# Patient Record
Sex: MALE | Race: WHITE | NOT HISPANIC OR LATINO | URBAN - METROPOLITAN AREA
[De-identification: names, ages, dates, MRNs, and addresses within clinical notes are randomized per-mention and may not be internally consistent; named-entity substitution may affect disease eponyms.]

---

## 2020-12-27 ENCOUNTER — EMERGENCY (EMERGENCY)
Facility: HOSPITAL | Age: 55
LOS: 1 days | End: 2020-12-27
Attending: STUDENT IN AN ORGANIZED HEALTH CARE EDUCATION/TRAINING PROGRAM
Payer: COMMERCIAL

## 2020-12-27 VITALS
OXYGEN SATURATION: 97 % | TEMPERATURE: 98 F | SYSTOLIC BLOOD PRESSURE: 150 MMHG | RESPIRATION RATE: 18 BRPM | HEART RATE: 83 BPM | WEIGHT: 255.07 LBS | HEIGHT: 75 IN | DIASTOLIC BLOOD PRESSURE: 94 MMHG

## 2020-12-27 VITALS
HEART RATE: 60 BPM | SYSTOLIC BLOOD PRESSURE: 123 MMHG | RESPIRATION RATE: 17 BRPM | DIASTOLIC BLOOD PRESSURE: 77 MMHG | OXYGEN SATURATION: 100 %

## 2020-12-27 LAB
ALBUMIN SERPL ELPH-MCNC: 4.2 G/DL — SIGNIFICANT CHANGE UP (ref 3.3–5)
ALP SERPL-CCNC: 54 U/L — SIGNIFICANT CHANGE UP (ref 40–120)
ALT FLD-CCNC: 32 U/L — SIGNIFICANT CHANGE UP (ref 10–45)
ANION GAP SERPL CALC-SCNC: 15 MMOL/L — SIGNIFICANT CHANGE UP (ref 5–17)
AST SERPL-CCNC: 32 U/L — SIGNIFICANT CHANGE UP (ref 10–40)
BASOPHILS # BLD AUTO: 0.06 K/UL — SIGNIFICANT CHANGE UP (ref 0–0.2)
BASOPHILS NFR BLD AUTO: 0.7 % — SIGNIFICANT CHANGE UP (ref 0–2)
BILIRUB SERPL-MCNC: 0.4 MG/DL — SIGNIFICANT CHANGE UP (ref 0.2–1.2)
BUN SERPL-MCNC: 17 MG/DL — SIGNIFICANT CHANGE UP (ref 7–23)
CALCIUM SERPL-MCNC: 9.2 MG/DL — SIGNIFICANT CHANGE UP (ref 8.4–10.5)
CHLORIDE SERPL-SCNC: 104 MMOL/L — SIGNIFICANT CHANGE UP (ref 96–108)
CO2 SERPL-SCNC: 19 MMOL/L — LOW (ref 22–31)
CREAT SERPL-MCNC: 0.68 MG/DL — SIGNIFICANT CHANGE UP (ref 0.5–1.3)
EOSINOPHIL # BLD AUTO: 0.07 K/UL — SIGNIFICANT CHANGE UP (ref 0–0.5)
EOSINOPHIL NFR BLD AUTO: 0.8 % — SIGNIFICANT CHANGE UP (ref 0–6)
GLUCOSE SERPL-MCNC: 109 MG/DL — HIGH (ref 70–99)
HCT VFR BLD CALC: 46.9 % — SIGNIFICANT CHANGE UP (ref 39–50)
HGB BLD-MCNC: 14.8 G/DL — SIGNIFICANT CHANGE UP (ref 13–17)
IMM GRANULOCYTES NFR BLD AUTO: 0.7 % — SIGNIFICANT CHANGE UP (ref 0–1.5)
LYMPHOCYTES # BLD AUTO: 1.33 K/UL — SIGNIFICANT CHANGE UP (ref 1–3.3)
LYMPHOCYTES # BLD AUTO: 15.3 % — SIGNIFICANT CHANGE UP (ref 13–44)
MCHC RBC-ENTMCNC: 29.1 PG — SIGNIFICANT CHANGE UP (ref 27–34)
MCHC RBC-ENTMCNC: 31.6 GM/DL — LOW (ref 32–36)
MCV RBC AUTO: 92.1 FL — SIGNIFICANT CHANGE UP (ref 80–100)
MONOCYTES # BLD AUTO: 0.47 K/UL — SIGNIFICANT CHANGE UP (ref 0–0.9)
MONOCYTES NFR BLD AUTO: 5.4 % — SIGNIFICANT CHANGE UP (ref 2–14)
NEUTROPHILS # BLD AUTO: 6.71 K/UL — SIGNIFICANT CHANGE UP (ref 1.8–7.4)
NEUTROPHILS NFR BLD AUTO: 77.1 % — HIGH (ref 43–77)
NRBC # BLD: 0 /100 WBCS — SIGNIFICANT CHANGE UP (ref 0–0)
PLATELET # BLD AUTO: 264 K/UL — SIGNIFICANT CHANGE UP (ref 150–400)
POTASSIUM SERPL-MCNC: 4.9 MMOL/L — SIGNIFICANT CHANGE UP (ref 3.5–5.3)
POTASSIUM SERPL-SCNC: 4.9 MMOL/L — SIGNIFICANT CHANGE UP (ref 3.5–5.3)
PROT SERPL-MCNC: 7.6 G/DL — SIGNIFICANT CHANGE UP (ref 6–8.3)
RBC # BLD: 5.09 M/UL — SIGNIFICANT CHANGE UP (ref 4.2–5.8)
RBC # FLD: 13.2 % — SIGNIFICANT CHANGE UP (ref 10.3–14.5)
SODIUM SERPL-SCNC: 138 MMOL/L — SIGNIFICANT CHANGE UP (ref 135–145)
WBC # BLD: 8.7 K/UL — SIGNIFICANT CHANGE UP (ref 3.8–10.5)
WBC # FLD AUTO: 8.7 K/UL — SIGNIFICANT CHANGE UP (ref 3.8–10.5)

## 2020-12-27 PROCEDURE — 99283 EMERGENCY DEPT VISIT LOW MDM: CPT

## 2020-12-27 PROCEDURE — 82962 GLUCOSE BLOOD TEST: CPT

## 2020-12-27 PROCEDURE — 99284 EMERGENCY DEPT VISIT MOD MDM: CPT

## 2020-12-27 PROCEDURE — 80177 DRUG SCRN QUAN LEVETIRACETAM: CPT

## 2020-12-27 PROCEDURE — 80053 COMPREHEN METABOLIC PANEL: CPT

## 2020-12-27 PROCEDURE — 93005 ELECTROCARDIOGRAM TRACING: CPT

## 2020-12-27 PROCEDURE — 93010 ELECTROCARDIOGRAM REPORT: CPT

## 2020-12-27 PROCEDURE — 85025 COMPLETE CBC W/AUTO DIFF WBC: CPT

## 2020-12-27 RX ORDER — SODIUM CHLORIDE 9 MG/ML
1000 INJECTION INTRAMUSCULAR; INTRAVENOUS; SUBCUTANEOUS ONCE
Refills: 0 | Status: COMPLETED | OUTPATIENT
Start: 2020-12-27 | End: 2020-12-27

## 2020-12-27 RX ADMIN — SODIUM CHLORIDE 1000 MILLILITER(S): 9 INJECTION INTRAMUSCULAR; INTRAVENOUS; SUBCUTANEOUS at 16:17

## 2020-12-27 NOTE — ED PROVIDER NOTE - ATTENDING CONTRIBUTION TO CARE
Villanueva DO: I have personally performed a face to face medical and diagnostic evaluation of the patient. I have discussed with and reviewed the resident's note and agree with the History, ROS, Physical Exam and MDM unless otherwise indicated. A brief summary of my personal evaluation and impression can be found below.    55M hx of seizure d/o followed by Dr Gilliam on keppra 500mg bid, noncompliant with medications, p/w seizure episode, reported by his wife as lasting a few minutes, characteristic of his usual seizures- noted as 'zoning' out or 'staring out'. No fever/ vision changes/ chest pain/ n/v/d/abd pain/ dysuria/ palpitations/cough or sob. Villanueva DO: I have personally performed a face to face medical and diagnostic evaluation of the patient. I have discussed with and reviewed the resident's note and agree with the History, ROS, Physical Exam and MDM unless otherwise indicated. A brief summary of my personal evaluation and impression can be found below.    55M hx of seizure d/o followed by Dr Gilliam on keppra 500mg bid, noncompliant with medications, p/w seizure like episode, reported by his wife as lasting a few minutes, characteristic of his usual seizures- described as 'zoning' out or 'staring out'. Pt was not confused afterwards, no incontinence. Pt did have mild headache this morning, reportedly dull, similar to hx chronic headaches. No preceding trauma or falls. No dizziness. Reports last outpt mri a few months ago and denies any known abnormalities. States he forgets to take his keppra medication- also did not realize it was bid dosing and not once daily. He did take his usual 500mg dose prior to ED arrival. No fever/ vision changes/ chest pain/ n/v/d/abd pain/ dysuria/ palpitations/cough or sob. No etoh/ drug use. Physical exam as authored above. Plan: clinical hx consistent w/ pts known seizure d/o, likely breakthrough sz in setting of medication noncompliance. Will eval for underlying metabolic derangements. Pt nontoxic appearing with no concerning neuro deficits or known trauma. Likely f/u outpt w/ neurologist if no abnormalities. screening ekg. Pt educated on need for medication compliance and avoidance of any dangerous activities such as driving while seizures not controlled.

## 2020-12-27 NOTE — ED PROVIDER NOTE - OBJECTIVE STATEMENT
54 y/o male with hx of seizure disorder presents to the ED with AMS. He reports sitting on the couch and "zoning out" not understanding what his wife was saying about two hours PTA. He is non compliant with Keppra, reports he frequently does not take it on the weekends. He was diagnosed with seizure disorder last year after a similar episode of altered mental status. He denies any recent fever, chlls, nausea, vomiting, cough, chest pain, shortness of breath. 56 y/o male with hx of seizure disorder presents to the ED with AMS. He reports sitting on the couch and "zoning out" not understanding what his wife was saying about two hours PTA. He is non compliant with Keppra, reports he frequently does not take it on the weekends. He was diagnosed with seizure disorder last year after a similar episode of altered mental status. He denies any recent fever, chills, nausea, vomiting, cough, chest pain, shortness of breath.

## 2020-12-27 NOTE — ED PROVIDER NOTE - NS ED ROS FT
Gen: No fever, normal appetite  Eyes: No eye irritation or discharge  ENT: No ear pain, congestion, sore throat  Resp: No cough or trouble breathing  Cardiovascular: No chest pain or palpitation  Gastroenteric: No nausea/vomiting, diarrhea, constipation  :  No change in urine output; no dysuria  MS: No joint or muscle pain  Skin: No rashes  Neuro: + headache, + possible seizure; no abnormal movements  Remainder negative, except as per the HPI

## 2020-12-27 NOTE — ED ADULT NURSE NOTE - OBJECTIVE STATEMENT
55 yr old male arrived to the ED from [facility] c/o AMS earlier today. HX focal seizures, Dx in summer, on kepra 500mg x2 a day. pt states he was slightly confused when he woke today and forgot that he is not physically going into work tm due to COVID restrictions. pt then states he was sitting on the cough and he "zoned out" while his wife was speaking to him. pt admits to not taking kepra on the weekends because he "doesn't think I need it because I haven't has a seizure". pt states last kepra dose was most likely Friday PM until his wife made him take a dose today. pt then endorses a mild headache which has resolved with 650mg of Tylneol. upon assessment pt is a&ox4, speaking clearly and in full sentences. pt is moving all extremities and is ambulatory with a steady gait. strength and sensation intact and equal shirley. lungs clear shirley. respirations are even and unlabored. abd is soft, non tender. pt denies fever, chills, cough, nausea, vomiting, chest pain or sob

## 2020-12-27 NOTE — ED PROVIDER NOTE - CLINICAL SUMMARY MEDICAL DECISION MAKING FREE TEXT BOX
56 y/o male with a hx of seizure disorder presenting to the ED with altered mental status, now resolved. Vitals stable. Exam with no focal findings. Will check basic labs and reassess. Deshawn Pan, DO PGY2 Normal rate, regular rhythm, normal S1, S2 heart sounds heard.

## 2020-12-27 NOTE — ED ADULT NURSE NOTE - CHIEF COMPLAINT QUOTE
ams earlier, resolved now, may have missed 1 or more doses of Keppra,  BEFAST NEG, aLSO C/O h/a, RESOLVED

## 2020-12-27 NOTE — ED PROVIDER NOTE - NSFOLLOWUPINSTRUCTIONS_ED_ALL_ED_FT
Follow up with your neurologist this week. You need to take your Keppra as prescribed, 500mg twice a day. Do not drive until you see your neurologist.    A seizure is abnormal electrical activity in the brain; the specific cause may or may not be found. Prior to a seizure you may experience a warning sensation (aura) that may include fear, nausea, dizziness, and visual changes such as flashing lights of spots. Common symptoms during the seizure may include an altered mental status, rhythmic jerking movements, drooling, grunting, loss of bladder or bowel control, or tongue biting. After a seizure, you may feel confused and sleepy.     Do not swim, drive, operate machinery, or engage in any risky activity during which a seizure could cause further injury to you or others. Teach friends and family what to do if you HAVE a seizure which includes laying you on the ground with your head on a cushion and turning you to the side to keep your breathing passages clear in case of vomiting.    SEEK IMMEDIATE MEDICAL CARE IF YOU HAVE ANY OF THE FOLLOWING SYMPTOMS: seizure lasting over 5 minutes, not waking up or persistent altered mental status after the seizure, or more frequent or worsening seizures.

## 2020-12-27 NOTE — ED PROVIDER NOTE - PATIENT PORTAL LINK FT
You can access the FollowMyHealth Patient Portal offered by [facility] by registering at the following website: [email or web address]. By joining [employer]’s FollowMyHealth portal, you will also be able to view your health information using other applications (apps) compatible with our system.

## 2020-12-29 LAB — LEVETIRACETAM SERPL-MCNC: 4.1 UG/ML — LOW (ref 10–40)

## 2020-12-30 NOTE — ED POST DISCHARGE NOTE - RESULT SUMMARY
12/30/20: Keppra levels low, pt seen in ED for seizure, known sz d/o ED note states pt non-compliant with keppra. No need for ED contact at this time. -Dhiraj Ortega PA-C

## 2021-08-27 PROBLEM — R56.9 UNSPECIFIED CONVULSIONS: Chronic | Status: ACTIVE | Noted: 2020-12-27

## 2021-09-07 ENCOUNTER — TRANSCRIPTION ENCOUNTER (OUTPATIENT)
Age: 56
End: 2021-09-07

## 2021-09-08 ENCOUNTER — APPOINTMENT (OUTPATIENT)
Dept: NEUROLOGY | Facility: CLINIC | Age: 56
End: 2021-09-08
Payer: COMMERCIAL

## 2021-09-08 VITALS
DIASTOLIC BLOOD PRESSURE: 89 MMHG | SYSTOLIC BLOOD PRESSURE: 137 MMHG | WEIGHT: 252 LBS | BODY MASS INDEX: 31.33 KG/M2 | HEART RATE: 87 BPM | HEIGHT: 75 IN

## 2021-09-08 DIAGNOSIS — R51.9 HEADACHE, UNSPECIFIED: ICD-10-CM

## 2021-09-08 PROCEDURE — 99243 OFF/OP CNSLTJ NEW/EST LOW 30: CPT

## 2021-09-08 NOTE — HISTORY OF PRESENT ILLNESS
[FreeTextEntry1] : \par 56 RH M seen with wife, Lenora.\par 6/2019 episode of amnesia in [place] x 5hrs, resolved spont.  Repeated question asking.\par x2 more times that year.\par \par Ex: \par In confused state, after getting out of train, possibly after arousal. Went to wrong/old job location, unexplained why.\par 1 Mo later, episode of standing, not speaking, confused/disoriented.\par \par Saw Neurologist Dr Barcenas then Dr Gilliam - EEG done x 24hrs - told unrevealing.  \par Started on LEV empirically to 500mg bid. \par \par 4-5 more incidents in last year subsequent to med adjustments, from sleep, confused state, speaking, disoriented, amnestic x few minutes.  Staring, lip smacking described at least once.  HA afterwards.\par Most recent 9/7/21, missed Rx. Also, missed Rx with event in 12/2020.\par Reports poor compliance with Rx.\par \par No ADR.  Occ HA.  \par \par SocHx:\par Works as .\par Was driving.\par Denies emotional trauma. \par No TOB.  Occ ETOH use socially. No drugs.\par \par PMHx:\par Events as child with possible staring spells/stiffening.\par no TBI, concussions, CNS infections.\par no FMHx szs.\par \par FmHx: \par Ma dc CAD, Fa dc colon ca\par brothers - COPD, CAD\par \par

## 2021-09-08 NOTE — PHYSICAL EXAM
[FreeTextEntry1] : General\par Constitutional: alert and in no acute distress. \par Psychiatric: affect normal, insight and judgment intact.\par Neurologic: \par Orientation: oriented to person, place, and time \par Attention: normal concentrating ability and attention was not decreased. \par Language: no difficulty naming common objects, repeating a phrase, writing a sentence; fluency, comprehension, and reading intact. \par Fund of knowledge: displays adequate knowledge of personal past history. \par Cranial Nerves: visual acuity intact bilaterally, visual fields full to confrontation, pupils equal round and reactive to light, extraocular motion intact, facial sensation intact symmetrically, face symmetrical, hearing was intact bilaterally, tongue and palate midline, head turning and shoulder shrug symmetric and there was no tongue deviation with protrusion. \par Motor: the patient is right hand dominant. \par muscle tone was normal in all four extremities, muscle strength was normal in all four extremities and normal bulk in all four extremities. \par Coordination:. normal gait. balance was intact. there was no past-pointing. no tremor present. \par Deep tendon reflexes: \par Biceps right 2+. Biceps left 2+.  \par Triceps right 2+. Triceps left 2+.  \par Brachioradialis right 2+. Brachioradialis left 2+.  \par Patella right 2+. Patella left 2+.  \par Ankle jerk right 2+. Ankle jerk left 2+. \par Eyes: the sclera and conjunctiva were normal. \par Neck: the appearance of the neck was normal. \par Musculoskeletal: no clubbing or cyanosis of the fingernails. \par Skin: no lesions, rash\par

## 2021-09-08 NOTE — DISCUSSION/SUMMARY
[Complex Partial] : complex partial [Idiopathic] : idiopathic  [Focal] : focal [Risks Associated with Driving/[place] Law] : As per my usual protocol, the patient was advised in regards to risks and driving privileges associated with the [place] Guidelines.  [Safety Recommendations] : The patient was advised in regards to the risk of seizures and general seizure safety recommendations including not to be bathing alone, climbing to high places and operating heavy machinery. [Compliance with Medications] : The importance of compliance with medications was reinforced. [Medication Side Effects] : High frequency and serious potential medication adverse effects were reviewed with the patient, including but not exclusive to psychiatric effects.  Information sheets on medication side effects were made available to the patient in our clinic.  The patient or advocate agrees to notify us for any concerns. [Sleep Hygiene/Sleep Disruption Risks] : Sleep hygiene and the risks of sleep disruption were discussed. [Risk of Death] : Risk of death associated with seizures / SUDEP was discussed. [Obtain Copies of Medical Records] : Patient was asked to obtain copies of pertinent prior medical records or studies [FreeTextEntry1] : \par Recurrent confusional episodes, amnesia.\par Concern for focal epilepsy.  Possible childhood seizures.\par DDx: seizures, TGA (less likely), sleep related disorder, r/o PNES..\par MRI/AEEG reportedly normal.\par \par -sleep study to eval for parasomnia\par -\par -agree with empiric LEV\par -p event HA.  rec naproxen or imitrex 100mg prn\par -\par -seizure precautions, recommendation is not to drive\par -\par x time 41min\par RTC 3mo\par

## 2021-09-08 NOTE — CONSULT LETTER
[Dear  ___] : Dear  [unfilled], [Consult Letter:] : I had the pleasure of evaluating your patient, [unfilled]. [( Thank you for referring [unfilled] for consultation for _____ )] : Thank you for referring [unfilled] for consultation for [unfilled] [Please see my note below.] : Please see my note below. [Consult Closing:] : Thank you very much for allowing me to participate in the care of this patient.  If you have any questions, please do not hesitate to contact me. [Sincerely,] : Sincerely, [FreeTextEntry2] : Conor Last MD\par Address: [address] [place] [address] [FreeTextEntry3] : Khalif Bryan MD, YANDEL\par Board Certified: Neurology, Clinical Neurophysiology, Epilepsy\par , Department of Neurology\par Epilepsy Fellowship \par [facility]\par \par

## 2021-12-27 ENCOUNTER — APPOINTMENT (OUTPATIENT)
Dept: PULMONOLOGY | Facility: CLINIC | Age: 56
End: 2021-12-27
Payer: COMMERCIAL

## 2021-12-27 VITALS
SYSTOLIC BLOOD PRESSURE: 149 MMHG | DIASTOLIC BLOOD PRESSURE: 93 MMHG | HEIGHT: 75 IN | WEIGHT: 250 LBS | TEMPERATURE: 97.2 F | OXYGEN SATURATION: 98 % | HEART RATE: 78 BPM | BODY MASS INDEX: 31.08 KG/M2

## 2021-12-27 PROCEDURE — 99204 OFFICE O/P NEW MOD 45 MIN: CPT | Mod: GC

## 2021-12-27 NOTE — PHYSICAL EXAM
[Normal Appearance] : normal appearance [General Appearance - Well Developed] : well developed [General Appearance - Well Nourished] : well nourished [Well Groomed] : well groomed [No Deformities] : no deformities [General Appearance - In No Acute Distress] : no acute distress [Normal Conjunctiva] : the conjunctiva exhibited no abnormalities [Eyelids - No Xanthelasma] : the eyelids demonstrated no xanthelasmas [III] : III [Neck Cervical Mass (___cm)] : no neck mass was observed [Neck Appearance] : the appearance of the neck was normal [Jugular Venous Distention Increased] : there was no jugular-venous distention [Thyroid Diffuse Enlargement] : the thyroid was not enlarged [Thyroid Nodule] : there were no palpable thyroid nodules [Heart Rate And Rhythm] : heart rate was normal and rhythm regular [Heart Sounds Gallop] : no gallops [Heart Sounds] : normal S1 and S2 [Murmurs] : no murmurs [Heart Sounds Pericardial Friction Rub] : no pericardial rub [Auscultation Breath Sounds / Voice Sounds] : lungs were clear to auscultation bilaterally [Abnormal Walk] : normal gait [Musculoskeletal - Swelling] : no joint swelling seen [Motor Tone] : muscle strength and tone were normal [Cyanosis, Localized] : no localized cyanosis [Nail Clubbing] : no clubbing of the fingernails [Petechial Hemorrhages (___cm)] : no petechial hemorrhages [] : no ischemic changes [Oriented To Time, Place, And Person] : oriented to person, place, and time [Impaired Insight] : insight and judgment were intact [Affect] : the affect was normal

## 2021-12-27 NOTE — ASSESSMENT
[FreeTextEntry1] : Patient is a 57 yo M w/ recurrent episodes of amnesia and confusion who presents to sleep clinic for initial patient evaluation for possible parasomnias. \par \par #Recurrent episodes of amnesia, confusion, automatisms - Unclear diagnosis with negative 24 hr EEG, possible complex partial seizures. Parasomnias less likely as patient endorses most episodes are not associated with awakening and one episode of relatively complex behavior (taking train and going to work at old address).however, some of the events may have occurred after awakening which places parasomnias in the differential for evaluation of pts events. \par - Will order in lab PSG for further evaluation\par \par Kris Stallings, F3\par d/w Dr. Fall

## 2021-12-27 NOTE — HISTORY OF PRESENT ILLNESS
[FreeTextEntry1] : Patient is a 57 yo M w/ recurrent episodes of amnesia and confusion who presents to sleep clinic for initial patient evaluation. As per patient and chart review, patient had episode of amnesia in 2019 that last for 5 hours (after waking up). Patient had 2 more episodes that year, one where he went to an old work address with realization after being confronted and episode of staring with chewing (while awake). Patient had a negative 24h EEG and was started on Keppra empirically for possible complex partial seizures. Patient endorses 4 to 5 more episodes after initiation of Keppra with most recent episode 9/2021, described by wife as staring off after being asked a question.\par \par Endorses bedtime of 1030PM to 11PM. Sleep latency of 30 minutes. Endorses waketime of 440AM. Wakes up throughout the night once per night to urinate but is able to fall back asleep after about 30 minutes. Wakes up feeling refreshed. \par \par Endorses witnessed snoring. Denies any witnessed apneas, sleepwalking, sleeptalking, hallucinations, restless leg symptoms, prior head trauma or concussions, incident febrile illness. Endorses headaches several times a week. Does endorse questionable history of partial seizures described as breath holding as child. \par \par ESS today is 2. \par \par Patient currently works as an . Denies any toxic habits except for occasional ETOH use. \par \par

## 2022-01-18 ENCOUNTER — APPOINTMENT (OUTPATIENT)
Dept: NEUROLOGY | Facility: CLINIC | Age: 57
End: 2022-01-18
Payer: COMMERCIAL

## 2022-01-18 PROCEDURE — 99212 OFFICE O/P EST SF 10 MIN: CPT | Mod: 95

## 2022-01-18 NOTE — DISCUSSION/SUMMARY
[FreeTextEntry1] : \par Recurrent confusional episodes, amnesia.\par Concern for focal epilepsy.  Possible childhood seizures.\par DDx: seizures, TGA (less likely), sleep related disorder, r/o PNES.\par MRI/AEEG reportedly normal.\par 12/2020 EEG ? single T3 spike.\par \par -sleep study to eval for parasomnia\par -agree with empiric LEV, pt taking lower dose than Rx currently 500mg bid (rx was 1000mg bid per Dr Gilliam)\par -p event HA.  rec naproxen or imitrex 100mg prn\par -seizure precautions, recommendation is not to drive\par \par x time 21min\par RTC 6mo\par  [Complex Partial] : complex partial [Idiopathic] : idiopathic  [Focal] : focal [[place]isks [place] with D[place] Law] : As per my usual protocol, the patient was advised in regards to risks and driving privileges associated with the [place] Guidelines.  [Safety Recommendations] : The patient was advised in regards to the risk of seizures and general seizure safety recommendations including not to be bathing alone, climbing to high places and operating heavy machinery. [Compliance with Medications] : The importance of compliance with medications was reinforced. [Medication Side Effects] : High frequency and serious potential medication adverse effects were reviewed with the patient, including but not exclusive to psychiatric effects.  Information sheets on medication side effects were made available to the patient in our clinic.  The patient or advocate agrees to notify us for any concerns. [Sleep Hygiene/Sleep Disruption Risks] : Sleep hygiene and the risks of sleep disruption were discussed. [Risk of Death] : Risk of death associated with seizures / SUDEP was discussed. [Obtain Copies of Medical Records] : Patient was asked to obtain copies of pertinent prior medical records or studies

## 2022-01-18 NOTE — CONSULT LETTER
[Dear  ___] : Dear  [unfilled], [Consult Letter:] : I had the pleasure of evaluating your patient, [unfilled]. [( Thank you for referring [unfilled] for consultation for _____ )] : Thank you for referring [unfilled] for consultation for [unfilled] [Please see my note below.] : Please see my note below. [Consult Closing:] : Thank you very much for allowing me to participate in the care of this patient.  If you have any questions, please do not hesitate to contact me. [Sincerely,] : Sincerely, [FreeTextEntry2] : Conor Last MD\par Address: [address] [place] [address] [FreeTextEntry3] : Khalif Bryan MD, YANDEL\par Board Certified: Neurology, Clinical Neurophysiology, Epilepsy\par , Department of Neurology\par Epilepsy Fellowship \par [facility]\par \par

## 2022-01-18 NOTE — PHYSICAL EXAM
[FreeTextEntry1] : Exam limited via telehealth:\par MS:  awake, alert, coherent, fluent, comprehension intact, affect stable.  oriented\par CN: EOMI, face symmetrical, grimace, smile symmetrical, eye closure symmetrical, hearing intact grossly.\par Motor: moving all 4 extremities freely.\par Coord: FFM and to screen intact \par Walking / Sensation deferred.\par Cardiac/pulmonary/extremity exam deferred via telehealth.\par

## 2022-02-25 ENCOUNTER — APPOINTMENT (OUTPATIENT)
Dept: SLEEP CENTER | Facility: CLINIC | Age: 57
End: 2022-02-25
Payer: COMMERCIAL

## 2022-02-25 ENCOUNTER — OUTPATIENT (OUTPATIENT)
Dept: OUTPATIENT SERVICES | Facility: HOSPITAL | Age: 57
LOS: 1 days | End: 2022-02-25
Payer: COMMERCIAL

## 2022-02-25 PROCEDURE — 95810 POLYSOM 6/> YRS 4/> PARAM: CPT | Mod: 26

## 2022-02-25 PROCEDURE — 95810 POLYSOM 6/> YRS 4/> PARAM: CPT

## 2022-03-09 DIAGNOSIS — G47.33 OBSTRUCTIVE SLEEP APNEA (ADULT) (PEDIATRIC): ICD-10-CM

## 2022-04-21 ENCOUNTER — APPOINTMENT (OUTPATIENT)
Dept: ORTHOPEDIC SURGERY | Facility: CLINIC | Age: 57
End: 2022-04-21
Payer: COMMERCIAL

## 2022-04-21 VITALS — BODY MASS INDEX: 30.84 KG/M2 | WEIGHT: 248 LBS | HEIGHT: 75 IN

## 2022-04-21 DIAGNOSIS — Z78.9 OTHER SPECIFIED HEALTH STATUS: ICD-10-CM

## 2022-04-21 DIAGNOSIS — M75.41 IMPINGEMENT SYNDROME OF RIGHT SHOULDER: ICD-10-CM

## 2022-04-21 PROCEDURE — 99213 OFFICE O/P EST LOW 20 MIN: CPT

## 2022-04-21 NOTE — PHYSICAL EXAM
[Right] : right shoulder [5 ___] : forward flexion 5[unfilled]/5 [5___] : external rotation 5[unfilled]/5 [TWNoteComboBox7] : active forward flexion 120 degrees [] : pain with strength testing [TWNoteComboBox4] : passive forward flexion 130 degrees [TWNoteComboBox6] : False [de-identified] : external rotation at 90 degrees of abduction 60 degrees [TWNoteComboBox5] : internal rotation at 90 degrees of abduction 25 degrees

## 2022-04-21 NOTE — HISTORY OF PRESENT ILLNESS
[7] : 7 [0] : 0 [de-identified] : 55 y/o RHD right shoulder pain for the past month. Started while taking down a deck. Describe santerior shoulder pain\par that is worse with activity. Difficulty lifting above the head. Denies radiating pain. He has been icing and taking Advil\par without relief. Denies history of prior injury.\par 12/2/21: FOLLOW UP OF RIGHT SHOULDER. GREAT RELIEF FROM [facility] LAST VISIT\par 1/13/22: f/u right shoulder\par 4/21/22: pt is her for a follow up on the right shoulder. pt has been going to PT, says shoulder is getting better. Wants more PT, only gets 12 sessions

## 2022-06-02 ENCOUNTER — APPOINTMENT (OUTPATIENT)
Dept: ORTHOPEDIC SURGERY | Facility: CLINIC | Age: 57
End: 2022-06-02
Payer: COMMERCIAL

## 2022-06-02 VITALS — HEIGHT: 75 IN | WEIGHT: 250 LBS | BODY MASS INDEX: 31.08 KG/M2

## 2022-06-02 DIAGNOSIS — M75.31 CALCIFIC TENDINITIS OF RIGHT SHOULDER: ICD-10-CM

## 2022-06-02 PROCEDURE — 99213 OFFICE O/P EST LOW 20 MIN: CPT

## 2022-06-02 NOTE — REASON FOR VISIT
[FreeTextEntry2] : pt is here for follow up of right shoulder. pt states pain level is the same as last visit.

## 2022-07-11 ENCOUNTER — APPOINTMENT (OUTPATIENT)
Dept: NEUROLOGY | Facility: CLINIC | Age: 57
End: 2022-07-11

## 2022-07-11 VITALS
HEART RATE: 102 BPM | WEIGHT: 246 LBS | HEIGHT: 75 IN | SYSTOLIC BLOOD PRESSURE: 126 MMHG | BODY MASS INDEX: 30.59 KG/M2 | DIASTOLIC BLOOD PRESSURE: 84 MMHG

## 2022-07-11 PROCEDURE — 99213 OFFICE O/P EST LOW 20 MIN: CPT

## 2022-07-11 NOTE — CONSULT LETTER
[Dear  ___] : Dear  [unfilled], [Consult Letter:] : I had the pleasure of evaluating your patient, [unfilled]. [( Thank you for referring [unfilled] for consultation for _____ )] : Thank you for referring [unfilled] for consultation for [unfilled] [Please see my note below.] : Please see my note below. [Consult Closing:] : Thank you very much for allowing me to participate in the care of this patient.  If you have any questions, please do not hesitate to contact me. [Sincerely,] : Sincerely, [FreeTextEntry2] : Conor Last MD\par Address: [address] [place] [address] [FreeTextEntry3] : Khalif Bryan MD, YANDEL\par Board Certified: Neurology, Clinical Neurophysiology, Epilepsy\par , Department of Neurology\par Epilepsy Fellowship \par [facility]\par \par

## 2022-07-11 NOTE — PHYSICAL EXAM
[FreeTextEntry1] : General\par Constitutional: alert and in no acute distress. \par Psychiatric: affect normal, insight and judgment intact.\par Neurologic: \par Orientation: oriented to person, place, and time 5/5, 5/5 \par Attention: normal concentrating ability and attention was not decreased.  DLROW 5/5. 2/3 recall\par Language: no difficulty naming common objects, repeating a phrase, writing a sentence; fluency, comprehension, and reading intact. \par Fund of knowledge: displays adequate knowledge of personal past history. \par Cranial Nerves: visual acuity intact bilaterally, visual fields full to confrontation, pupils equal round and reactive to light, extraocular motion intact, facial sensation intact symmetrically, face symmetrical, hearing was intact bilaterally, tongue and palate midline, head turning and shoulder shrug symmetric and there was no tongue deviation with protrusion. \par Motor: the patient is right hand dominant. \par muscle tone was normal in all four extremities, muscle strength was normal in all four extremities and normal bulk in all four extremities.   R frozen shoulder pain ext rotation.\par Coordination:. normal gait. balance was intact. there was no past-pointing. no tremor present. \par Deep tendon reflexes: \par Biceps right 2+. Biceps left 2+. \par Triceps right 2+. Triceps left 2+. \par Brachioradialis right 2+. Brachioradialis left 2+. \par Patella right 2+. Patella left 2+. \par Ankle jerk right 2+. Ankle jerk left 2+. \par Eyes: the sclera and conjunctiva were normal. \par Neck: the appearance of the neck was normal. \par Musculoskeletal: no clubbing or cyanosis of the fingernails. \par Skin: no lesions, rash

## 2022-07-11 NOTE — HISTORY OF PRESENT ILLNESS
[FreeTextEntry1] : \par Updates: \par No further episodes.  Admitted issue with compliance reducing dose intermittently on own accord to 500mg/d.\par Sleep study pending.\par Difficulty sleeping.\par Somewhat more forgetful of late.  For names.\par \par PMHX: \par 57 RH M initially seen with wife, Lenora.\par 6/2019 episode of amnesia in [place] x 5hrs, resolved spont.  Repeated question asking.\par x2 more times that year.\par \par Ex:  In confused state, after getting out of train, possibly after arousal. Went to wrong/old job location, unexplained why.\par 1 Mo later, episode of standing, not speaking, confused/disoriented.\par \par Saw Neurologist Dr Barcenas then Dr Gilliam - EEG done x 24hrs - told unrevealing.  \par Started on LEV empirically to 500mg bid. \par \par 4-5 more incidents in last year subsequent to med adjustments, from sleep, confused state, speaking, disoriented, amnestic x few minutes.  Staring, lip smacking described at least once.  HA afterwards.\par Most recent 9/7/21, missed Rx. Also, missed Rx with event in 12/2020.\par Reports poor compliance with Rx.\par \par No ADR.  Occ HA.  \par \par SocHx:\par Works as .\par Was driving.\par Denies emotional trauma. \par No TOB.  Occ ETOH use socially. No drugs.\par \par PMHx:\par Events as child with possible staring spells/stiffening.\par no TBI, concussions, CNS infections.\par no FMHx szs.\par \par FmHx: \par Ma dc CAD, Fa dc colon ca\par brothers - COPD, CAD\par \par

## 2022-07-11 NOTE — DISCUSSION/SUMMARY
[Complex Partial] : complex partial [Idiopathic] : idiopathic  [Focal] : focal [Risks Associated with Driving/[place] Law] : As per my usual protocol, the patient was advised in regards to risks and driving privileges associated with the [place] Guidelines.  [Safety Recommendations] : The patient was advised in regards to the risk of seizures and general seizure safety recommendations including not to be bathing alone, climbing to high places and operating heavy machinery. [Compliance with Medications] : The importance of compliance with medications was reinforced. [Medication Side Effects] : High frequency and serious potential medication adverse effects were reviewed with the patient, including but not exclusive to psychiatric effects.  Information sheets on medication side effects were made available to the patient in our clinic.  The patient or advocate agrees to notify us for any concerns. [Sleep Hygiene/Sleep Disruption Risks] : Sleep hygiene and the risks of sleep disruption were discussed. [Risk of Death] : Risk of death associated with seizures / SUDEP was discussed. [Obtain Copies of Medical Records] : Patient was asked to obtain copies of pertinent prior medical records or studies [FreeTextEntry1] : \par Recurrent confusional episodes, amnesia.\par Concern for focal epilepsy.  Possible childhood seizures.\par DDx: seizures, TGA (less likely), sleep related disorder, r/o PNES.\par MRI/AEEG reportedly normal.\par 12/2020 EEG ? single T3 spike.\par \par -sleep study to eval for parasomnia\par -agree with empiric LEV, resume 500mg bid (rx was 1000mg bid per Dr Gilliam)\par -p event HA.  rec naproxen   prn\par -seizure precautions, recommendation is not to drive\par \par x time 21min\par RTC 6mo\par

## 2022-08-02 LAB
ALBUMIN SERPL ELPH-MCNC: 4.3 G/DL
ALP BLD-CCNC: 52 U/L
ALT SERPL-CCNC: 21 U/L
ANION GAP SERPL CALC-SCNC: 10 MMOL/L
AST SERPL-CCNC: 17 U/L
BASOPHILS # BLD AUTO: 0.06 K/UL
BASOPHILS NFR BLD AUTO: 0.9 %
BILIRUB DIRECT SERPL-MCNC: 0.2 MG/DL
BILIRUB INDIRECT SERPL-MCNC: 0.5 MG/DL
BILIRUB SERPL-MCNC: 0.6 MG/DL
BUN SERPL-MCNC: 17 MG/DL
CALCIUM SERPL-MCNC: 9.5 MG/DL
CHLORIDE SERPL-SCNC: 106 MMOL/L
CO2 SERPL-SCNC: 28 MMOL/L
CREAT SERPL-MCNC: 1.03 MG/DL
EGFR: 84 ML/MIN/1.73M2
EOSINOPHIL # BLD AUTO: 0.26 K/UL
EOSINOPHIL NFR BLD AUTO: 4 %
GLUCOSE SERPL-MCNC: 92 MG/DL
HCT VFR BLD CALC: 47 %
HGB BLD-MCNC: 14.8 G/DL
IMM GRANULOCYTES NFR BLD AUTO: 0.2 %
LYMPHOCYTES # BLD AUTO: 1.76 K/UL
LYMPHOCYTES NFR BLD AUTO: 27.2 %
MAN DIFF?: NORMAL
MCHC RBC-ENTMCNC: 30.5 PG
MCHC RBC-ENTMCNC: 31.5 GM/DL
MCV RBC AUTO: 96.9 FL
MONOCYTES # BLD AUTO: 0.54 K/UL
MONOCYTES NFR BLD AUTO: 8.3 %
NEUTROPHILS # BLD AUTO: 3.84 K/UL
NEUTROPHILS NFR BLD AUTO: 59.4 %
PLATELET # BLD AUTO: 254 K/UL
POTASSIUM SERPL-SCNC: 4.7 MMOL/L
PROT SERPL-MCNC: 6.4 G/DL
RBC # BLD: 4.85 M/UL
RBC # FLD: 13.3 %
SODIUM SERPL-SCNC: 144 MMOL/L
WBC # FLD AUTO: 6.47 K/UL

## 2022-08-05 ENCOUNTER — NON-APPOINTMENT (OUTPATIENT)
Age: 57
End: 2022-08-05

## 2022-08-05 LAB — LEVETIRACETAM SERPL-MCNC: 2.2 UG/ML

## 2022-08-25 ENCOUNTER — APPOINTMENT (OUTPATIENT)
Dept: ORTHOPEDIC SURGERY | Facility: CLINIC | Age: 57
End: 2022-08-25

## 2022-08-25 VITALS — WEIGHT: 246 LBS | BODY MASS INDEX: 30.59 KG/M2 | HEIGHT: 75 IN

## 2022-08-25 DIAGNOSIS — M75.01 ADHESIVE CAPSULITIS OF RIGHT SHOULDER: ICD-10-CM

## 2022-08-25 PROCEDURE — 99213 OFFICE O/P EST LOW 20 MIN: CPT

## 2022-08-25 NOTE — ASSESSMENT
[FreeTextEntry1] : MAKING SMALL IMPROVEMENT\par WOULD CONTINUE TO BENEFOT FROM PT\par HE IS WORKING, NO PAIN MEDS

## 2022-10-10 NOTE — ED ADULT NURSE NOTE - CHPI ED NUR SEIZURE DURATION
minute(s) Methotrexate Pregnancy And Lactation Text: This medication is Pregnancy Category X and is known to cause fetal harm. This medication is excreted in breast milk.

## 2022-10-22 ENCOUNTER — EMERGENCY (EMERGENCY)
Facility: HOSPITAL | Age: 57
LOS: 1 days | Discharge: HOME | End: 2022-10-22
Attending: EMERGENCY MEDICINE
Payer: COMMERCIAL

## 2022-10-22 VITALS
SYSTOLIC BLOOD PRESSURE: 123 MMHG | RESPIRATION RATE: 20 BRPM | HEIGHT: 75 IN | OXYGEN SATURATION: 92 % | TEMPERATURE: 98 F | DIASTOLIC BLOOD PRESSURE: 92 MMHG | HEART RATE: 99 BPM

## 2022-10-22 LAB
ALBUMIN SERPL ELPH-MCNC: 4.6 G/DL — SIGNIFICANT CHANGE UP (ref 3.3–5)
ALP SERPL-CCNC: 63 U/L — SIGNIFICANT CHANGE UP (ref 40–120)
ALT FLD-CCNC: 39 U/L — SIGNIFICANT CHANGE UP (ref 10–45)
ANION GAP SERPL CALC-SCNC: 10 MMOL/L — SIGNIFICANT CHANGE UP (ref 5–17)
AST SERPL-CCNC: 27 U/L — SIGNIFICANT CHANGE UP (ref 10–40)
BASE EXCESS BLDV CALC-SCNC: 0 MMOL/L — SIGNIFICANT CHANGE UP (ref -2–3)
BASE EXCESS BLDV CALC-SCNC: 0.8 MMOL/L — SIGNIFICANT CHANGE UP (ref -2–3)
BASOPHILS # BLD AUTO: 0.07 K/UL — SIGNIFICANT CHANGE UP (ref 0–0.2)
BASOPHILS NFR BLD AUTO: 0.6 % — SIGNIFICANT CHANGE UP (ref 0–2)
BILIRUB SERPL-MCNC: 0.2 MG/DL — SIGNIFICANT CHANGE UP (ref 0.2–1.2)
BLOOD GAS VENOUS - CREATININE: SIGNIFICANT CHANGE UP MG/DL (ref 0.5–1.3)
BUN SERPL-MCNC: 18 MG/DL — SIGNIFICANT CHANGE UP (ref 7–23)
CA-I SERPL-SCNC: 1.19 MMOL/L — SIGNIFICANT CHANGE UP (ref 1.15–1.33)
CA-I SERPL-SCNC: 1.29 MMOL/L — SIGNIFICANT CHANGE UP (ref 1.15–1.33)
CALCIUM SERPL-MCNC: 9.7 MG/DL — SIGNIFICANT CHANGE UP (ref 8.4–10.5)
CHLORIDE BLDV-SCNC: 104 MMOL/L — SIGNIFICANT CHANGE UP (ref 96–108)
CHLORIDE BLDV-SCNC: 105 MMOL/L — SIGNIFICANT CHANGE UP (ref 96–108)
CHLORIDE SERPL-SCNC: 104 MMOL/L — SIGNIFICANT CHANGE UP (ref 96–108)
CO2 BLDV-SCNC: 30 MMOL/L — HIGH (ref 22–26)
CO2 BLDV-SCNC: 32 MMOL/L — HIGH (ref 22–26)
CO2 SERPL-SCNC: 26 MMOL/L — SIGNIFICANT CHANGE UP (ref 22–31)
CREAT SERPL-MCNC: 1.05 MG/DL — SIGNIFICANT CHANGE UP (ref 0.5–1.3)
EGFR: 82 ML/MIN/1.73M2 — SIGNIFICANT CHANGE UP
EOSINOPHIL # BLD AUTO: 0.03 K/UL — SIGNIFICANT CHANGE UP (ref 0–0.5)
EOSINOPHIL NFR BLD AUTO: 0.2 % — SIGNIFICANT CHANGE UP (ref 0–6)
GAS PNL BLDV: 136 MMOL/L — SIGNIFICANT CHANGE UP (ref 136–145)
GAS PNL BLDV: 136 MMOL/L — SIGNIFICANT CHANGE UP (ref 136–145)
GAS PNL BLDV: SIGNIFICANT CHANGE UP
GLUCOSE BLDV-MCNC: 123 MG/DL — HIGH (ref 70–99)
GLUCOSE BLDV-MCNC: 139 MG/DL — HIGH (ref 70–99)
GLUCOSE SERPL-MCNC: 134 MG/DL — HIGH (ref 70–99)
HCO3 BLDV-SCNC: 28 MMOL/L — SIGNIFICANT CHANGE UP (ref 22–29)
HCO3 BLDV-SCNC: 30 MMOL/L — HIGH (ref 22–29)
HCT VFR BLD CALC: 47.5 % — SIGNIFICANT CHANGE UP (ref 39–50)
HCT VFR BLDA CALC: 44 % — SIGNIFICANT CHANGE UP (ref 39–51)
HCT VFR BLDA CALC: 47 % — SIGNIFICANT CHANGE UP (ref 39–51)
HGB BLD CALC-MCNC: 14.5 G/DL — SIGNIFICANT CHANGE UP (ref 12.6–17.4)
HGB BLD CALC-MCNC: 15.8 G/DL — SIGNIFICANT CHANGE UP (ref 12.6–17.4)
HGB BLD-MCNC: 15.3 G/DL — SIGNIFICANT CHANGE UP (ref 13–17)
IMM GRANULOCYTES NFR BLD AUTO: 1 % — HIGH (ref 0–0.9)
LACTATE BLDV-MCNC: 1.3 MMOL/L — SIGNIFICANT CHANGE UP (ref 0.5–2)
LACTATE BLDV-MCNC: 1.6 MMOL/L — SIGNIFICANT CHANGE UP (ref 0.5–2)
LYMPHOCYTES # BLD AUTO: 1.34 K/UL — SIGNIFICANT CHANGE UP (ref 1–3.3)
LYMPHOCYTES # BLD AUTO: 10.8 % — LOW (ref 13–44)
MCHC RBC-ENTMCNC: 29.8 PG — SIGNIFICANT CHANGE UP (ref 27–34)
MCHC RBC-ENTMCNC: 32.2 GM/DL — SIGNIFICANT CHANGE UP (ref 32–36)
MCV RBC AUTO: 92.6 FL — SIGNIFICANT CHANGE UP (ref 80–100)
MONOCYTES # BLD AUTO: 0.58 K/UL — SIGNIFICANT CHANGE UP (ref 0–0.9)
MONOCYTES NFR BLD AUTO: 4.7 % — SIGNIFICANT CHANGE UP (ref 2–14)
NEUTROPHILS # BLD AUTO: 10.27 K/UL — HIGH (ref 1.8–7.4)
NEUTROPHILS NFR BLD AUTO: 82.7 % — HIGH (ref 43–77)
NRBC # BLD: 0 /100 WBCS — SIGNIFICANT CHANGE UP (ref 0–0)
PCO2 BLDV: 58 MMHG — HIGH (ref 42–55)
PCO2 BLDV: 65 MMHG — HIGH (ref 42–55)
PCP SPEC-MCNC: SIGNIFICANT CHANGE UP
PH BLDV: 7.27 — LOW (ref 7.32–7.43)
PH BLDV: 7.29 — LOW (ref 7.32–7.43)
PLATELET # BLD AUTO: 307 K/UL — SIGNIFICANT CHANGE UP (ref 150–400)
PO2 BLDV: 26 MMHG — SIGNIFICANT CHANGE UP (ref 25–45)
PO2 BLDV: 40 MMHG — SIGNIFICANT CHANGE UP (ref 25–45)
POTASSIUM BLDV-SCNC: 4.3 MMOL/L — SIGNIFICANT CHANGE UP (ref 3.5–5.1)
POTASSIUM BLDV-SCNC: 4.3 MMOL/L — SIGNIFICANT CHANGE UP (ref 3.5–5.1)
POTASSIUM SERPL-MCNC: 4.3 MMOL/L — SIGNIFICANT CHANGE UP (ref 3.5–5.3)
POTASSIUM SERPL-SCNC: 4.3 MMOL/L — SIGNIFICANT CHANGE UP (ref 3.5–5.3)
PROT SERPL-MCNC: 8 G/DL — SIGNIFICANT CHANGE UP (ref 6–8.3)
RBC # BLD: 5.13 M/UL — SIGNIFICANT CHANGE UP (ref 4.2–5.8)
RBC # FLD: 13.1 % — SIGNIFICANT CHANGE UP (ref 10.3–14.5)
SAO2 % BLDV: 36.4 % — LOW (ref 67–88)
SAO2 % BLDV: 63.7 % — LOW (ref 67–88)
SODIUM SERPL-SCNC: 140 MMOL/L — SIGNIFICANT CHANGE UP (ref 135–145)
WBC # BLD: 12.41 K/UL — HIGH (ref 3.8–10.5)
WBC # FLD AUTO: 12.41 K/UL — HIGH (ref 3.8–10.5)

## 2022-10-22 PROCEDURE — 99291 CRITICAL CARE FIRST HOUR: CPT

## 2022-10-22 PROCEDURE — 71046 X-RAY EXAM CHEST 2 VIEWS: CPT | Mod: 26

## 2022-10-22 PROCEDURE — 70450 CT HEAD/BRAIN W/O DYE: CPT | Mod: 26,MA

## 2022-10-22 RX ORDER — SODIUM CHLORIDE 9 MG/ML
1000 INJECTION INTRAMUSCULAR; INTRAVENOUS; SUBCUTANEOUS ONCE
Refills: 0 | Status: COMPLETED | OUTPATIENT
Start: 2022-10-22 | End: 2022-10-22

## 2022-10-22 RX ORDER — LEVETIRACETAM 250 MG/1
1000 TABLET, FILM COATED ORAL ONCE
Refills: 0 | Status: COMPLETED | OUTPATIENT
Start: 2022-10-22 | End: 2022-10-22

## 2022-10-22 RX ADMIN — SODIUM CHLORIDE 1000 MILLILITER(S): 9 INJECTION INTRAMUSCULAR; INTRAVENOUS; SUBCUTANEOUS at 21:42

## 2022-10-22 RX ADMIN — LEVETIRACETAM 400 MILLIGRAM(S): 250 TABLET, FILM COATED ORAL at 21:32

## 2022-10-22 NOTE — ED PROVIDER NOTE - PHYSICAL EXAMINATION
GEN: no acute distress, AAOx3, mildly confused  EYES: no conjunctival injection, EOMI  ENT: no stridor, MMM, neck supple with full ROM, (+) tongue bite  CV: normal capillary refill, normal heart rate, normal peripheral perfusion  RESP: non-labored breathing, no respiratory distress  SKIN: warm and dry, no skin breaks  NEURO: CN grossly intact, no focal sensory or motor deficits

## 2022-10-22 NOTE — ED PROVIDER NOTE - OBJECTIVE STATEMENT
57-year-old male with history of seizures on Keppra 1000 mg daily who presents with multiple seizures.  Patient was traveling back from [place] with his wife, had a witnessed seizure that lasted for about 15 minutes, and then a subsequent seizure that lasted for about 10 minutes when he was being driven in the car, wife states sitting in the chair, no trauma or fall.  Patient with a third seizure lasted for less than a minute while being transported to the critical/recess room.  Patient confused at this time.  Patient and wife had been on plane since 3 AM, possibly missed doses of Keppra.

## 2022-10-22 NOTE — ED ADULT NURSE NOTE - OBJECTIVE STATEMENT
pt is a 58yo male PMH undiagnosed seizure disorder presenting to the ED for seizures. pt wife at bedside states that pt experienced a seizure around 630PM on airplane home from [place], describes patient as drooling, facial twitching, and eyes rolling back for around 10 mins. pt wife states pt had a second seizure in the car ride from the airport, describes similar presenting symptoms with confusion for about 5 minutes. third seizure witnessed by triage nurse, pt became AMS, eyes rolling back, "bubbling" at the mouth. pt takes keppra daily, unknown whether or not pt took Keppra today. pt found to be A/Ox3 with some confusion following seizure in triage, pt unable to recall where he was flying in from/where he had been on vacation. pt placed on cardiac monitor, NS to the high 90s. pt noted to have bite marks on the R lateral aspect of the tongue following seizures.

## 2022-10-22 NOTE — CONSULT NOTE ADULT - ASSESSMENT
Patient ROXY PENN is a 57y (1965) man with seizure disorder presents with seizures on keppra 500 BID. Patient was on a plane coming back from vacation in [place]. Per chart review, wife noted patient to have multiple seizures, eyes rolled back and foaming at the mouth. Patient does not recall episode. He reports forgetting to take keppra often and often tells his wife he took medications even thought he doesn't. He complains of mild headache. He denies dizziness, changes to vision, weakness, numbness. Exam, nonfocal, 5/5 strength all extremities.    Impression: Seizure due to medication noncompliance     Recommendation:  [] Restart home AED's   [] CTH  [] Monitor electrolytes and replete and needed    Case to be seen and discussed with Dr. Cornejo Attending  Patient ROXY PENN is a 57y (1965) man with seizure disorder presents with seizures on keppra 500 BID. Patient was on a plane coming back from vacation in [place]. Per chart review, wife noted patient to have multiple seizures, eyes rolled back and foaming at the mouth. Patient does not recall episode. He reports forgetting to take keppra often and often tells his wife he took medications even thought he doesn't. He complains of mild headache. He denies dizziness, changes to vision, weakness, numbness. Exam, nonfocal, 5/5 strength all extremities.    Impression: Seizure due to medication noncompliance     Recommendation:  [x] 1g Keppra load per ED  [] Restart home AED's   [] CTH  [] Monitor electrolytes and replete and needed  [] Follow up with Dr. Bryan, patient's neurologist     Case to be seen and discussed with Dr. Cornejo Attending

## 2022-10-22 NOTE — CONSULT NOTE ADULT - SUBJECTIVE AND OBJECTIVE BOX
Neurology - Consult Note    -  Spectra: [phone] ([facility]), [phone] ([facility])  -    HPI: Patient ROXY PENN is a 57y (1965) man with seizure disorder presents with seizures on keppra 500 BID. Patient was on a plane coming back from vacation in [place]. Per chart review, wife noted patient to have multiple seizures, eyes rolled back and foaming at the mouth. Patient does not recall episode. He reports forgetting to take keppra often and often tells his wife he took medications even thought he doesn't. He complains of mild headache. He denies dizziness, changes to vision, weakness, numbness.       Review of Systems:   All other review of systems is negative unless indicated above.    Allergies:      PMHx/PSHx/Family Hx: As above, otherwise see below   Seizure        Medications:  MEDICATIONS  (STANDING):    MEDICATIONS  (PRN):      Vitals:  T(C): 36.7 (10-22-22 @ 21:33), Max: 36.7 (10-22-22 @ 21:33)  HR: 98 (10-22-22 @ 21:33) (98 - 99)  BP: 144/92 (10-22-22 @ 21:33) (123/92 - 144/92)  RR: 20 (10-22-22 @ 21:33) (20 - 20)  SpO2: 98% (10-22-22 @ 21:33) (92% - 98%)    Physical Examination:   General - NAD    Neurologic Exam:  Mental status - Awake, Alert, Oriented to person, place, and time. Speech fluent, repetition and naming intact. Follows simple and complex commands. Attention/concentration    Cranial nerves - PERRLA, VFF, EOMI, face sensation (V1-V3) intact b/l, facial strength intact without asymmetry b/l, hearing intact b/l, palate with symmetric elevation, trapezius OR sternocleidomastiod 5/5 strength b/l, tongue midline on protrusion with full lateral movement    Motor - Normal bulk and tone throughout. No pronator drift.    Strength testing            Deltoid      Biceps      Triceps     Wrist Extension    Wrist Flexion       R            5                 5               5                     5                              5                      L             5                 5               5                     5                              5                                     Hip Flexion    Hip Extension                Knee Flexion    Knee Extension    Dorsiflexion    Plantar Flexion  R              5                           5                       5                           5                            5                          5  L              5                           5                        5                           5                            5                          5    Sensation - Light touch intact throughout    DTR's -             Biceps      Triceps     Brachioradialis      Patellar    Ankle    Toes/plantar response  R             2+             2+                  2+                       2+            2+                 Down  L              2+             2+                 2+                        2+           2+                 Down    Coordination - Finger to Nose intact b/l. No tremors appreciated    Gait and station - Normal casual gait.     Labs:                        15.3   12.41 )-----------( 307      ( 22 Oct 2022 21:39 )             47.5     10-22    140  |  104  |  18  ----------------------------<  134<H>  4.3   |  26  |  1.05    Ca    9.7      22 Oct 2022 21:39    TPro  8.0  /  Alb  4.6  /  TBili  0.2  /  DBili  x   /  AST  27  /  ALT  39  /  AlkPhos  63  10-22    CAPILLARY BLOOD GLUCOSE      POCT Blood Glucose.: 112 mg/dL (22 Oct 2022 21:08)    LIVER FUNCTIONS - ( 22 Oct 2022 21:39 )  Alb: 4.6 g/dL / Pro: 8.0 g/dL / ALK PHOS: 63 U/L / ALT: 39 U/L / AST: 27 U/L / GGT: x               CSF:                  Radiology:     Neurology - Consult Note    -  Spectra: [phone] ([facility]), [phone] ([facility])  -    HPI: Patient ROXY PENN is a 57y (1965) man with focal epilepsy on keppra presents with seizures on keppra 500 BID. Patient was on a plane coming back from vacation in [place]. Per chart review, wife noted patient to have multiple seizures, eyes rolled back and foaming at the mouth. Patient does not recall episode. He reports forgetting to take keppra often and often tells his wife he took medications even thought he doesn't. He complains of mild headache. He denies dizziness, changes to vision, weakness, numbness.     Of note, patient follows with Dr. Bryan Neurology. On last visit 8/2022, noted some concern for patient compliance.      Review of Systems:   All other review of systems is negative unless indicated above.    Allergies:      PMHx/PSHx/Family Hx: As above, otherwise see below   Seizure        Medications:  MEDICATIONS  (STANDING):    MEDICATIONS  (PRN):      Vitals:  T(C): 36.7 (10-22-22 @ 21:33), Max: 36.7 (10-22-22 @ 21:33)  HR: 98 (10-22-22 @ 21:33) (98 - 99)  BP: 144/92 (10-22-22 @ 21:33) (123/92 - 144/92)  RR: 20 (10-22-22 @ 21:33) (20 - 20)  SpO2: 98% (10-22-22 @ 21:33) (92% - 98%)    Physical Examination:   General - NAD    Neurologic Exam:  Mental status - Awake, Alert, Oriented to person, place, and time. Speech fluent, repetition and naming intact. Follows simple and complex commands. Attention/concentration    Cranial nerves - PERRLA, VFF, EOMI, face sensation (V1-V3) intact b/l, facial strength intact without asymmetry b/l, hearing intact b/l, palate with symmetric elevation, trapezius OR sternocleidomastiod 5/5 strength b/l, tongue midline on protrusion with full lateral movement    Motor - Normal bulk and tone throughout. No pronator drift.    Strength testing            Deltoid      Biceps      Triceps     Wrist Extension    Wrist Flexion       R            5                 5               5                     5                              5                      L             5                 5               5                     5                              5                                     Hip Flexion    Hip Extension                Knee Flexion    Knee Extension    Dorsiflexion    Plantar Flexion  R              5                           5                       5                           5                            5                          5  L              5                           5                        5                           5                            5                          5    Sensation - Light touch intact throughout    DTR's -             Biceps      Triceps     Brachioradialis      Patellar    Ankle    Toes/plantar response  R             2+             2+                  2+                       2+            2+                 Down  L              2+             2+                 2+                        2+           2+                 Down    Coordination - Finger to Nose intact b/l. No tremors appreciated    Gait and station - Normal casual gait.     Labs:                        15.3   12.41 )-----------( 307      ( 22 Oct 2022 21:39 )             47.5     10-22    140  |  104  |  18  ----------------------------<  134<H>  4.3   |  26  |  1.05    Ca    9.7      22 Oct 2022 21:39    TPro  8.0  /  Alb  4.6  /  TBili  0.2  /  DBili  x   /  AST  27  /  ALT  39  /  AlkPhos  63  10-22    CAPILLARY BLOOD GLUCOSE      POCT Blood Glucose.: 112 mg/dL (22 Oct 2022 21:08)    LIVER FUNCTIONS - ( 22 Oct 2022 21:39 )  Alb: 4.6 g/dL / Pro: 8.0 g/dL / ALK PHOS: 63 U/L / ALT: 39 U/L / AST: 27 U/L / GGT: x               CSF:                  Radiology:     Neurology - Consult Note    -  Spectra: [phone] ([facility]), [phone] ([facility])  -    HPI: Patient ROXY PENN is a 57y (1965) man with focal epilepsy on keppra presents with seizures. Patient was on a plane coming back from vacation in [place]. Per chart review, wife noted patient to have multiple seizures, eyes rolled back and foaming at the mouth. Patient does not recall episode. He reports forgetting to take keppra often and often tells his wife he took medications even thought he doesn't. Last seizure was over a year ago. He complains of mild headache. He denies dizziness, changes to vision, weakness, numbness. Of note, patient follows with Dr. Bryan Neurology. On last visit 8/2022, noted some concern for patient compliance.      Review of Systems:   All other review of systems is negative unless indicated above.    Allergies:      PMHx/PSHx/Family Hx: As above, otherwise see below   Seizure        Medications:  MEDICATIONS  (STANDING):    MEDICATIONS  (PRN):      Vitals:  T(C): 36.7 (10-22-22 @ 21:33), Max: 36.7 (10-22-22 @ 21:33)  HR: 98 (10-22-22 @ 21:33) (98 - 99)  BP: 144/92 (10-22-22 @ 21:33) (123/92 - 144/92)  RR: 20 (10-22-22 @ 21:33) (20 - 20)  SpO2: 98% (10-22-22 @ 21:33) (92% - 98%)    Physical Examination:   General - NAD    Neurologic Exam:  Mental status - Awake, Alert, Oriented to person, place, and time. Speech fluent, repetition and naming intact. Follows simple and complex commands. Attention/concentration    Cranial nerves - PERRLA, VFF, EOMI, face sensation (V1-V3) intact b/l, facial strength intact without asymmetry b/l, hearing intact b/l, palate with symmetric elevation, trapezius OR sternocleidomastiod 5/5 strength b/l, tongue midline on protrusion with full lateral movement    Motor - Normal bulk and tone throughout. No pronator drift.    Strength testing            Deltoid      Biceps      Triceps     Wrist Extension    Wrist Flexion       R            5                 5               5                     5                              5                      L             5                 5               5                     5                              5                                     Hip Flexion    Hip Extension                Knee Flexion    Knee Extension    Dorsiflexion    Plantar Flexion  R              5                           5                       5                           5                            5                          5  L              5                           5                        5                           5                            5                          5    Sensation - Light touch intact throughout    DTR's -             Biceps      Triceps     Brachioradialis      Patellar    Ankle    Toes/plantar response  R             2+             2+                  2+                       2+            2+                 Down  L              2+             2+                 2+                        2+           2+                 Down    Coordination - Finger to Nose intact b/l. No tremors appreciated    Gait and station - Normal casual gait.     Labs:                        15.3   12.41 )-----------( 307      ( 22 Oct 2022 21:39 )             47.5     10-22    140  |  104  |  18  ----------------------------<  134<H>  4.3   |  26  |  1.05    Ca    9.7      22 Oct 2022 21:39    TPro  8.0  /  Alb  4.6  /  TBili  0.2  /  DBili  x   /  AST  27  /  ALT  39  /  AlkPhos  63  10-22    CAPILLARY BLOOD GLUCOSE      POCT Blood Glucose.: 112 mg/dL (22 Oct 2022 21:08)    LIVER FUNCTIONS - ( 22 Oct 2022 21:39 )  Alb: 4.6 g/dL / Pro: 8.0 g/dL / ALK PHOS: 63 U/L / ALT: 39 U/L / AST: 27 U/L / GGT: x               CSF:                  Radiology:     Neurology - Consult Note    -  Spectra: [phone] ([facility]), [phone] ([facility])  -    HPI: Patient ROXY PENN is a 57y (1965) man with focal epilepsy on keppra presents with seizures. Patient was on a plane coming back from vacation in [place]. Per chart review, wife noted patient to have multiple seizures, eyes rolled back and foaming at the mouth. Patient does not recall episode. He reports forgetting to take keppra often and often tells his wife he took medications even thought he doesn't. Last seizure was over a year ago. Per chart review, Patient was started on keppra in 2019 for Recurrent confusional episodes, amnesia. EEG was unremarkable, but placed on keppra for ppx by previous neurologist. He complains of mild headache. He denies dizziness, changes to vision, weakness, numbness. Of note, patient follows with Dr. Bryan Neurology. On last visit 8/2022, noted some concern for patient compliance, remained on keppra 500 mg BID.      Review of Systems:   All other review of systems is negative unless indicated above.    Allergies:      PMHx/PSHx/Family Hx: As above, otherwise see below   Seizure        Medications:  MEDICATIONS  (STANDING):    MEDICATIONS  (PRN):      Vitals:  T(C): 36.7 (10-22-22 @ 21:33), Max: 36.7 (10-22-22 @ 21:33)  HR: 98 (10-22-22 @ 21:33) (98 - 99)  BP: 144/92 (10-22-22 @ 21:33) (123/92 - 144/92)  RR: 20 (10-22-22 @ 21:33) (20 - 20)  SpO2: 98% (10-22-22 @ 21:33) (92% - 98%)    Physical Examination:   General - NAD    Neurologic Exam:  Mental status - Awake, Alert, Oriented to person, place, and time. Speech fluent, repetition and naming intact. Follows simple and complex commands. Attention/concentration    Cranial nerves - PERRLA, VFF, EOMI, face sensation (V1-V3) intact b/l, facial strength intact without asymmetry b/l, hearing intact b/l, palate with symmetric elevation, trapezius OR sternocleidomastiod 5/5 strength b/l, tongue midline on protrusion with full lateral movement    Motor - Normal bulk and tone throughout. No pronator drift.    Strength testing            Deltoid      Biceps      Triceps     Wrist Extension    Wrist Flexion       R            5                 5               5                     5                              5                      L             5                 5               5                     5                              5                                     Hip Flexion    Hip Extension                Knee Flexion    Knee Extension    Dorsiflexion    Plantar Flexion  R              5                           5                       5                           5                            5                          5  L              5                           5                        5                           5                            5                          5    Sensation - Light touch intact throughout    DTR's -             Biceps      Triceps     Brachioradialis      Patellar    Ankle    Toes/plantar response  R             2+             2+                  2+                       2+            2+                 Down  L              2+             2+                 2+                        2+           2+                 Down    Coordination - Finger to Nose intact b/l. No tremors appreciated    Gait and station - Normal casual gait.     Labs:                        15.3   12.41 )-----------( 307      ( 22 Oct 2022 21:39 )             47.5     10-22    140  |  104  |  18  ----------------------------<  134<H>  4.3   |  26  |  1.05    Ca    9.7      22 Oct 2022 21:39    TPro  8.0  /  Alb  4.6  /  TBili  0.2  /  DBili  x   /  AST  27  /  ALT  39  /  AlkPhos  63  10-22    CAPILLARY BLOOD GLUCOSE      POCT Blood Glucose.: 112 mg/dL (22 Oct 2022 21:08)    LIVER FUNCTIONS - ( 22 Oct 2022 21:39 )  Alb: 4.6 g/dL / Pro: 8.0 g/dL / ALK PHOS: 63 U/L / ALT: 39 U/L / AST: 27 U/L / GGT: x               CSF:                  Radiology:

## 2022-10-22 NOTE — ED ADULT NURSE NOTE - MODE OF DISCHARGE
Palliative care team reached out to the patients spouse Rebecca, patient's son in law joined phone call conversation. Reviewed current diagnosis, prognosis, and treatment options. Family verbalized understanding.   Family wishes to continue aggressive medical management. Further stating their wish is for every possible life saving intervention available to the patient be utilized.     Plan is to continue aggressive medical management. Ambulatory

## 2022-10-22 NOTE — ED PROVIDER NOTE - ATTENDING CONTRIBUTION TO CARE
Lul Baptiste MD:   I personally saw the patient and performed a substantive portion of the visit including all aspects of the medical decision making.    MDM: 57-year-old male with history of seizures on Keppra 1000 mg daily who presents with multiple seizures.  Patient was traveling back from [place] with his wife, had a witnessed seizure that lasted for about 15 minutes, and then a subsequent seizure that lasted for about 10 minutes when he was being driven in the car, wife states sitting in the chair, no trauma or fall.  Patient with a third seizure lasted for less than a minute while being transported to the critical/recess room.  Patient confused at this time.  Patient and wife had been on plane since 3 AM, possibly missed doses of Keppra.  On examination, patient is ANO x3, but is still mildly confused, poor historian, neuro intact in all 4 extremities, has tongue bite bilaterally.  Concern for multiple breakthrough seizures.  Will evaluate for underlying cause.  Will obtain labs to evaluate for hematologic disorder, metabolic derangements, hepatic and renal function, and screen for infectious pathology.  Will obtain CT Head to evaluate for acute intracranial pathology.  Will obtain chest x-ray to evaluate for acute cardiopulmonary pathology. Load with Keppra. If has seizure in the ED, will give benzos. Consult with neurology    Critical Care Billing: Multiple Seizures  Upon my evaluation, this patient had a high probability of imminent or life-threatening deterioration, which required my direct attention, intervention, and personal management.  The patient has a  medical condition that impairs one or more vital organ systems.  Frequent personal assessment and adjustment of medical interventions was performed.      I have personally provided # minutes of critical care time exclusive of time spent on separately billable procedures. Time includes review of laboratory data, radiology results, discussion with consultants, patient and family; monitoring for potential decompensation, as well as time spent retrieving data and reviewing the chart and documenting the visit. Interventions were performed as documented above. Lul Baptiste MD:   I personally saw the patient and performed a substantive portion of the visit including all aspects of the medical decision making.    MDM: 57-year-old male with history of seizures on Keppra 1000 mg daily who presents with multiple seizures.  Patient was traveling back from [place] with his wife, had a witnessed seizure that lasted for about 15 minutes, and then a subsequent seizure that lasted for about 10 minutes when he was being driven in the car, wife states sitting in the chair, no trauma or fall.  Patient with a third seizure lasted for less than a minute while being transported to the critical/recess room.  Patient confused at this time.  Patient and wife had been on plane since 3 AM, possibly missed doses of Keppra.  On examination, patient is ANO x3, but is still mildly confused, poor historian, neuro intact in all 4 extremities, has tongue bite bilaterally.  Concern for multiple breakthrough seizures.  Will evaluate for underlying cause.  Will obtain labs to evaluate for hematologic disorder, metabolic derangements, hepatic and renal function, and screen for infectious pathology.  Will obtain CT Head to evaluate for acute intracranial pathology.  Will obtain chest x-ray to evaluate for acute cardiopulmonary pathology. Load with Keppra. If has seizure in the ED, will give benzos. Consult with neurology.     Signed out at 2315 pending CTH and neuro recommendations, and close reassessments for further treatment and disposition decisions.     Critical Care Billing: Multiple Seizures  Upon my evaluation, this patient had a high probability of imminent or life-threatening deterioration, which required my direct attention, intervention, and personal management.  The patient has a  medical condition that impairs one or more vital organ systems.  Frequent personal assessment and adjustment of medical interventions was performed.      I have personally provided 35 minutes of critical care time exclusive of time spent on separately billable procedures. Time includes review of laboratory data, radiology results, discussion with consultants, patient and family; monitoring for potential decompensation, as well as time spent retrieving data and reviewing the chart and documenting the visit. Interventions were performed as documented above.

## 2022-10-23 VITALS
OXYGEN SATURATION: 96 % | DIASTOLIC BLOOD PRESSURE: 81 MMHG | RESPIRATION RATE: 20 BRPM | TEMPERATURE: 98 F | SYSTOLIC BLOOD PRESSURE: 125 MMHG | HEART RATE: 84 BPM

## 2022-10-23 LAB
AMPHET UR-MCNC: NEGATIVE — SIGNIFICANT CHANGE UP
APPEARANCE UR: ABNORMAL
BACTERIA # UR AUTO: NEGATIVE — SIGNIFICANT CHANGE UP
BARBITURATES UR SCN-MCNC: NEGATIVE — SIGNIFICANT CHANGE UP
BENZODIAZ UR-MCNC: NEGATIVE — SIGNIFICANT CHANGE UP
BILIRUB UR-MCNC: NEGATIVE — SIGNIFICANT CHANGE UP
COCAINE METAB.OTHER UR-MCNC: NEGATIVE — SIGNIFICANT CHANGE UP
COLOR SPEC: SIGNIFICANT CHANGE UP
DIFF PNL FLD: ABNORMAL
EPI CELLS # UR: 0 /HPF — SIGNIFICANT CHANGE UP
GLUCOSE UR QL: NEGATIVE — SIGNIFICANT CHANGE UP
HYALINE CASTS # UR AUTO: 0 /LPF — SIGNIFICANT CHANGE UP (ref 0–7)
KETONES UR-MCNC: NEGATIVE — SIGNIFICANT CHANGE UP
LEUKOCYTE ESTERASE UR-ACNC: NEGATIVE — SIGNIFICANT CHANGE UP
METHADONE UR-MCNC: NEGATIVE — SIGNIFICANT CHANGE UP
NITRITE UR-MCNC: NEGATIVE — SIGNIFICANT CHANGE UP
OPIATES UR-MCNC: NEGATIVE — SIGNIFICANT CHANGE UP
OXYCODONE UR-MCNC: NEGATIVE — SIGNIFICANT CHANGE UP
PCP UR-MCNC: NEGATIVE — SIGNIFICANT CHANGE UP
PH UR: 5 — SIGNIFICANT CHANGE UP (ref 5–8)
PROLACTIN SERPL-MCNC: 15.3 NG/ML — SIGNIFICANT CHANGE UP (ref 4.1–18.4)
PROT UR-MCNC: SIGNIFICANT CHANGE UP
RBC CASTS # UR COMP ASSIST: 1 /HPF — SIGNIFICANT CHANGE UP (ref 0–4)
SP GR SPEC: 1.02 — SIGNIFICANT CHANGE UP (ref 1.01–1.02)
THC UR QL: NEGATIVE — SIGNIFICANT CHANGE UP
UROBILINOGEN FLD QL: NEGATIVE — SIGNIFICANT CHANGE UP
WBC UR QL: 1 /HPF — SIGNIFICANT CHANGE UP (ref 0–5)

## 2022-10-23 PROCEDURE — 80177 DRUG SCRN QUAN LEVETIRACETAM: CPT

## 2022-10-23 PROCEDURE — 82330 ASSAY OF CALCIUM: CPT

## 2022-10-23 PROCEDURE — 71046 X-RAY EXAM CHEST 2 VIEWS: CPT

## 2022-10-23 PROCEDURE — 84295 ASSAY OF SERUM SODIUM: CPT

## 2022-10-23 PROCEDURE — 82803 BLOOD GASES ANY COMBINATION: CPT

## 2022-10-23 PROCEDURE — G0378: CPT

## 2022-10-23 PROCEDURE — 99285 EMERGENCY DEPT VISIT HI MDM: CPT | Mod: 25

## 2022-10-23 PROCEDURE — 83605 ASSAY OF LACTIC ACID: CPT

## 2022-10-23 PROCEDURE — 80307 DRUG TEST PRSMV CHEM ANLYZR: CPT

## 2022-10-23 PROCEDURE — 70450 CT HEAD/BRAIN W/O DYE: CPT | Mod: MA

## 2022-10-23 PROCEDURE — 85018 HEMOGLOBIN: CPT

## 2022-10-23 PROCEDURE — 99236 HOSP IP/OBS SAME DATE HI 85: CPT

## 2022-10-23 PROCEDURE — 80053 COMPREHEN METABOLIC PANEL: CPT

## 2022-10-23 PROCEDURE — 85014 HEMATOCRIT: CPT

## 2022-10-23 PROCEDURE — 81001 URINALYSIS AUTO W/SCOPE: CPT

## 2022-10-23 PROCEDURE — 82565 ASSAY OF CREATININE: CPT

## 2022-10-23 PROCEDURE — 82962 GLUCOSE BLOOD TEST: CPT

## 2022-10-23 PROCEDURE — 96374 THER/PROPH/DIAG INJ IV PUSH: CPT

## 2022-10-23 PROCEDURE — 82435 ASSAY OF BLOOD CHLORIDE: CPT

## 2022-10-23 PROCEDURE — 84132 ASSAY OF SERUM POTASSIUM: CPT

## 2022-10-23 PROCEDURE — 84146 ASSAY OF PROLACTIN: CPT

## 2022-10-23 PROCEDURE — 82947 ASSAY GLUCOSE BLOOD QUANT: CPT

## 2022-10-23 PROCEDURE — 87086 URINE CULTURE/COLONY COUNT: CPT

## 2022-10-23 PROCEDURE — 85025 COMPLETE CBC W/AUTO DIFF WBC: CPT

## 2022-10-23 RX ORDER — LEVETIRACETAM 250 MG/1
500 TABLET, FILM COATED ORAL
Refills: 0 | Status: DISCONTINUED | OUTPATIENT
Start: 2022-10-23 | End: 2022-10-26

## 2022-10-23 RX ORDER — ACETAMINOPHEN 500 MG
975 TABLET ORAL ONCE
Refills: 0 | Status: COMPLETED | OUTPATIENT
Start: 2022-10-23 | End: 2022-10-23

## 2022-10-23 RX ADMIN — Medication 975 MILLIGRAM(S): at 02:56

## 2022-10-23 RX ADMIN — LEVETIRACETAM 500 MILLIGRAM(S): 250 TABLET, FILM COATED ORAL at 09:18

## 2022-10-23 NOTE — ED CDU PROVIDER INITIAL DAY NOTE - PHYSICAL EXAMINATION
GEN: Pt in NAD, A&O x3.  PSYCH: Affect appropriate.  EYES: Sclera white w/o injection. PERRL, EOMI.   ENT: Head NCAT. MMM, superficial abrasions to lateral tongue borders b/l. Neck supple FROM.  RESP: CTA b/l, no wheezes, rales, or rhonchi.   CARDIAC: RRR, clear distinct S1, S2, no appreciable murmurs.  ABD: Abdomen soft, non-tender. No CVAT b/l.  VASC: 2+ radial and dorsalis pedis pulses b/l. No edema or calf tenderness.  NEURO: CN2-12 grossly intact. Normal and equal sensation and 5/5 strength UE and LE b/l. Pronator drift negative. Normal gait and gross cerebellar function.   SKIN: No rashes on the trunk.

## 2022-10-23 NOTE — ED CDU PROVIDER DISPOSITION NOTE - NSFOLLOWUPINSTRUCTIONS_ED_ALL_ED_FT
1) Follow-up with your primary medical doctor in 2-3 days.       Additionally follow-up with your neurologist in 2-3 days    2) Take all medication as directed.  **As per neurology recommendations____    3) Rest and stay hydrated.    4) Return to the ER if symptoms persist or worsen, or if you experience weakness, numbness, difficulty speaking/swallowing/walking, dizziness, lightheadedness, passing out (losing consciousness), vomiting, severe headache, change in vision, or if any other concerning symptoms.    Please read the information below concerning stroke prevention:    Stroke Prevention  Some medical conditions and behaviors are associated with a higher chance of having a stroke. You can help prevent a stroke by making nutrition, lifestyle, and other changes, including managing any medical conditions you may have.  WHAT NUTRITION CHANGES CAN BE MADE?  Eat healthy foods. You can do this by:  · Choosing foods high in fiber, such as fresh fruits and vegetables and whole grains.  · Eating at least 5 or more servings of fruits and vegetables a day. Try to fill half of your plate at each meal with fruits and vegetables.  · Choosing lean protein foods, such as lean cuts of meat, poultry without skin, fish, tofu, beans, and nuts.  · Eating low-fat dairy products.  · Avoiding foods that are high in salt (sodium). This can help lower blood pressure.  · Avoiding foods that have saturated fat, trans fat, and cholesterol. This can help prevent high cholesterol.  · Avoiding processed and premade foods.  Follow your health care provider's specific guidelines for losing weight, controlling high blood pressure (hypertension), lowering high cholesterol, and managing diabetes. These may include:  · Reducing your daily calorie intake.  · Limiting your daily sodium intake to 1,500 milligrams (mg).  · Using only healthy fats for cooking, such as olive oil, canola oil, or sunflower oil.  · Counting your daily carbohydrate intake.  WHAT LIFESTYLE CHANGES CAN BE MADE?  Maintain a healthy weight. Talk to your health care provider about your ideal weight.  Get at least 30 minutes of moderate physical activity at least 5 days a week. Moderate activity includes brisk walking, biking, and swimming.  Do not use any products that contain nicotine or tobacco, such as cigarettes and e-cigarettes. If you need  help quitting, ask your health care provider. It may also be helpful to avoid exposure to secondhand smoke.  Limit alcohol intake to no more than 1 drink a day for nonpregnant women and 2 drinks a day for men. One  drink equals 12 oz of beer, 5 oz of wine, or 1½ oz of hard liquor.  Stop any illegal drug use.      Avoid taking birth control pills. Talk to your health care provider about the risks of taking birth control pills if:  · You are over 35 years old.  · You smoke.  · You get migraines.  · You have ever had a blood clot.  WHAT OTHER CHANGES CAN BE MADE?  Manage your cholesterol levels.  · Eating a healthy diet is important for preventing high cholesterol. If cholesterol cannot be managed  through diet alone, you may also need to take medicines.  · Take any prescribed medicines to control your cholesterol as told by your health care provider.  Manage your diabetes.  · Eating a healthy diet and exercising regularly are important parts of managing your blood sugar. If your  blood sugar cannot be managed through diet and exercise, you may need to take medicines.  · Take any prescribed medicines to control your diabetes as told by your health care provider.  Control your hypertension.  · To reduce your risk of stroke, try to keep your blood pressure below 130/80.  · Eating a healthy diet and exercising regularly are an important part of controlling your blood pressure. If  your blood pressure cannot be managed through diet and exercise, you may need to take medicines.  · Take any prescribed medicines to control hypertension as told by your health care provider.  · Ask your health care provider if you should monitor your blood pressure at home.  · Have your blood pressure checked every year, even if your blood pressure is normal. Blood pressure  increases with age and some medical conditions.  Get evaluated for sleep disorders (sleep apnea). Talk to your health care provider about getting a sleep evaluation if you snore a lot or have excessive sleepiness.  Take over-the-counter and prescription medicines only as told by your health care provider. Aspirin or blood thinners (antiplatelets or anticoagulants) may be recommended to reduce your risk of forming blood clots that can lead to stroke.  Make sure that any other medical conditions you have, such as atrial fibrillation or atherosclerosis, are  managed.  WHAT ARE THE WARNING SIGNS OF A STROKE?  The warning signs of a stroke can be easily remembered as BEFAST.  B is for balance. Signs include:  · Dizziness.  · Loss of balance or coordination.  · Sudden trouble walking.  E is for eyes. Signs include:  · A sudden change in vision.  · Trouble seeing.  F is for face. Signs include:  · Sudden weakness or numbness of the face.  · The face or eyelid drooping to one side.  A is for arms. Signs include:  · Sudden weakness or numbness of the arm, usually on one side of the body.  S is for speech. Signs include:  · Trouble speaking (aphasia).  · Trouble understanding.  T is for time.      · These symptoms may represent a serious problem that is an emergency. Do not wait to see if the symptoms will go away. Get medical help right away. Call your local emergency services (911 in the [place] Do not drive yourself to the hospital.  Other signs of stroke may include:  · A sudden, severe headache with no known cause.  · Nausea or vomiting.  · Seizure.    WHERE TO FIND MORE INFORMATION  For more information, visit:  American Stroke Association: www.strokeassociation.org  National Stroke Association: www.stroke.org  SUMMARY  You can prevent a stroke by eating healthy, exercising, not smoking, limiting alcohol intake, and managing  any medical conditions you may have.  Do not use any products that contain nicotine or tobacco, such as cigarettes and e-cigarettes. If you need  help quitting, ask your health care provider. It may also be helpful to avoid exposure to secondhand smoke.  Remember BEFAST for warning signs of stroke. Get help right away if you or a loved one has any of these  signs. 1) Follow-up with your primary medical doctor in 2-3 days.       Additionally follow-up with your neurologist in 2-3 days    2) Take all medication as directed.      CONTINUE YOUR SEIZURE MEDICATION    3) Rest and stay hydrated.    4) Return to the ER if symptoms persist or worsen, or if you experience weakness, numbness, difficulty speaking/swallowing/walking, dizziness, lightheadedness, passing out (losing consciousness), vomiting, severe headache, change in vision, or if any other concerning symptoms.    Please read the information below concerning stroke prevention:    Stroke Prevention  Some medical conditions and behaviors are associated with a higher chance of having a stroke. You can help prevent a stroke by making nutrition, lifestyle, and other changes, including managing any medical conditions you may have.  WHAT NUTRITION CHANGES CAN BE MADE?  Eat healthy foods. You can do this by:  · Choosing foods high in fiber, such as fresh fruits and vegetables and whole grains.  · Eating at least 5 or more servings of fruits and vegetables a day. Try to fill half of your plate at each meal with fruits and vegetables.  · Choosing lean protein foods, such as lean cuts of meat, poultry without skin, fish, tofu, beans, and nuts.  · Eating low-fat dairy products.  · Avoiding foods that are high in salt (sodium). This can help lower blood pressure.  · Avoiding foods that have saturated fat, trans fat, and cholesterol. This can help prevent high cholesterol.  · Avoiding processed and premade foods.  Follow your health care provider's specific guidelines for losing weight, controlling high blood pressure (hypertension), lowering high cholesterol, and managing diabetes. These may include:  · Reducing your daily calorie intake.  · Limiting your daily sodium intake to 1,500 milligrams (mg).  · Using only healthy fats for cooking, such as olive oil, canola oil, or sunflower oil.  · Counting your daily carbohydrate intake.  WHAT LIFESTYLE CHANGES CAN BE MADE?  Maintain a healthy weight. Talk to your health care provider about your ideal weight.  Get at least 30 minutes of moderate physical activity at least 5 days a week. Moderate activity includes brisk walking, biking, and swimming.  Do not use any products that contain nicotine or tobacco, such as cigarettes and e-cigarettes. If you need  help quitting, ask your health care provider. It may also be helpful to avoid exposure to secondhand smoke.  Limit alcohol intake to no more than 1 drink a day for nonpregnant women and 2 drinks a day for men. One  drink equals 12 oz of beer, 5 oz of wine, or 1½ oz of hard liquor.  Stop any illegal drug use.      Avoid taking birth control pills. Talk to your health care provider about the risks of taking birth control pills if:  · You are over 35 years old.  · You smoke.  · You get migraines.  · You have ever had a blood clot.  WHAT OTHER CHANGES CAN BE MADE?  Manage your cholesterol levels.  · Eating a healthy diet is important for preventing high cholesterol. If cholesterol cannot be managed  through diet alone, you may also need to take medicines.  · Take any prescribed medicines to control your cholesterol as told by your health care provider.  Manage your diabetes.  · Eating a healthy diet and exercising regularly are important parts of managing your blood sugar. If your  blood sugar cannot be managed through diet and exercise, you may need to take medicines.  · Take any prescribed medicines to control your diabetes as told by your health care provider.  Control your hypertension.  · To reduce your risk of stroke, try to keep your blood pressure below 130/80.  · Eating a healthy diet and exercising regularly are an important part of controlling your blood pressure. If  your blood pressure cannot be managed through diet and exercise, you may need to take medicines.  · Take any prescribed medicines to control hypertension as told by your health care provider.  · Ask your health care provider if you should monitor your blood pressure at home.  · Have your blood pressure checked every year, even if your blood pressure is normal. Blood pressure  increases with age and some medical conditions.  Get evaluated for sleep disorders (sleep apnea). Talk to your health care provider about getting a sleep evaluation if you snore a lot or have excessive sleepiness.  Take over-the-counter and prescription medicines only as told by your health care provider. Aspirin or blood thinners (antiplatelets or anticoagulants) may be recommended to reduce your risk of forming blood clots that can lead to stroke.  Make sure that any other medical conditions you have, such as atrial fibrillation or atherosclerosis, are  managed.  WHAT ARE THE WARNING SIGNS OF A STROKE?  The warning signs of a stroke can be easily remembered as BEFAST.  B is for balance. Signs include:  · Dizziness.  · Loss of balance or coordination.  · Sudden trouble walking.  E is for eyes. Signs include:  · A sudden change in vision.  · Trouble seeing.  F is for face. Signs include:  · Sudden weakness or numbness of the face.  · The face or eyelid drooping to one side.  A is for arms. Signs include:  · Sudden weakness or numbness of the arm, usually on one side of the body.  S is for speech. Signs include:  · Trouble speaking (aphasia).  · Trouble understanding.  T is for time.      · These symptoms may represent a serious problem that is an emergency. Do not wait to see if the symptoms will go away. Get medical help right away. Call your local emergency services (911 in the [place] Do not drive yourself to the hospital.  Other signs of stroke may include:  · A sudden, severe headache with no known cause.  · Nausea or vomiting.  · Seizure.    WHERE TO FIND MORE INFORMATION  For more information, visit:  American Stroke Association: www.strokeassociation.org  National Stroke Association: www.stroke.org  SUMMARY  You can prevent a stroke by eating healthy, exercising, not smoking, limiting alcohol intake, and managing  any medical conditions you may have.  Do not use any products that contain nicotine or tobacco, such as cigarettes and e-cigarettes. If you need  help quitting, ask your health care provider. It may also be helpful to avoid exposure to secondhand smoke.  Remember BEFAST for warning signs of stroke. Get help right away if you or a loved one has any of these  signs.

## 2022-10-23 NOTE — ED CDU PROVIDER INITIAL DAY NOTE - NS ED ATTENDING STATEMENT MOD
This was a shared visit with the GEORGE. I reviewed and verified the documentation and independently performed the documented:

## 2022-10-23 NOTE — ED CDU PROVIDER DISPOSITION NOTE - CLINICAL COURSE
58 y/o M PMHx seizure d/o on Keppra 500mg BID who presents with multiple seizures.  Patient was traveling back from [place] with his wife, had a witnessed seizure that lasted for about 15 minutes, and then a subsequent seizure that lasted for about 10 minutes when he was being driven in the car, wife states sitting in the chair, no trauma or fall.  Patient with a third seizure lasted for less than a minute while being transported to the critical/recess room. Pt confused at time of arrival. On CDU PA eval pt awake and oriented, reports not taking his AEDs while on vacation, notes moderate generalized HA, denies any other complaints of pain or injury s/p seizure.  ED Course: WBC 12, pH 7.27 -> 7.29, UA trace blood, Utox negative, CTH no acute pathology, CXR no acute pathology. Pt given keppra load IV, evaluated by neurology who recommended resume AEDs, will d/w attending for further recs. Pt sent to CDU for frequent eval and monitoring for seizure and final neuroogy eval/recs.  CDU Course: neuro recommended____. 56 y/o M PMHx seizure d/o on Keppra 500mg BID who presents with multiple seizures.  Patient was traveling back from [place] with his wife, had a witnessed seizure that lasted for about 15 minutes, and then a subsequent seizure that lasted for about 10 minutes when he was being driven in the car, wife states sitting in the chair, no trauma or fall.  Patient with a third seizure lasted for less than a minute while being transported to the critical/recess room. Pt confused at time of arrival. On CDU PA eval pt awake and oriented, reports not taking his AEDs while on vacation, notes moderate generalized HA, denies any other complaints of pain or injury s/p seizure.  ED Course: WBC 12, pH 7.27 -> 7.29, UA trace blood, Utox negative, CTH no acute pathology, CXR no acute pathology. Pt given keppra load IV, evaluated by neurology who recommended resume AEDs, will d/w attending for further recs. Pt sent to CDU for frequent eval and monitoring for seizure and final neuroogy eval/recs.  CDU Course: Patient resting comfortably without complaints. neuro intact. no seizure like activity throughout cdu stay. patient waiting for neuro but has another urgent appt that he cannot miss. made neuro aware pt has to leave earlier but pt cannot wait any longer. patient will see his outpt neuro this week and will be compliant on normal keppra dose. cleared for discharge home per ED attending Dr. Monsivais

## 2022-10-23 NOTE — ED CDU PROVIDER INITIAL DAY NOTE - ATTENDING APP SHARED VISIT CONTRIBUTION OF CARE
attending Ashley: pt with breakthrough seizures. Evaluated by neurology in ED. Plan for CDU for frequent reevaluations and monitoring for seizure and final neurology eval/recs

## 2022-10-23 NOTE — ED CDU PROVIDER INITIAL DAY NOTE - PROGRESS NOTE DETAILS
MIHIR Fontaine: Patient seen at bedside in NAD.  VSS.  Patient resting comfortably without complaints. neuro intact. no seizure like activity throughout cdu stay. patient waiting for neuro but has another urgent appt that he cannot miss. made neuro aware pt has to leave earlier but pt cannot wait any longer. patient will see his outpt neuro this week and will be compliant on normal keppra dose. cleared for discharge home per ED attending Dr. Monsivais

## 2022-10-23 NOTE — ED ADULT NURSE REASSESSMENT NOTE - NS ED NURSE REASSESS COMMENT FT1
pt switched to oxygen tank, maintained at 3L nc, transported to X-ray. pt to receive CT following X-Ray and then brought back to critical room B, providers aware
Pt received from [facility]EN[facility]. Pt oriented to CDU & plan of care was discussed. Pt A&O x 4. Pt in CDU for frequent eval and monitoring for seizure and final neurology eval/recs. Pt denies any dizziness, lightheadedness, weakness, numbness, visual or speech disturbances. On neuro exam, A&O x 4, PERRLA, EOMI,  strength equal, sensation intact, clear speech. Pt on a cardiac monitor in NSR, HR in 60's. V/S stable, pt afebrile,  IV in place, patent and free of signs of infiltration. Pt resting in bed. Safety & comfort measures maintained. Call bell in reach. Will continue to monitor.

## 2022-10-23 NOTE — ED CDU PROVIDER DISPOSITION NOTE - ATTENDING APP SHARED VISIT CONTRIBUTION OF CARE
Attending note (Ignacio): 58y/o M with h/o seizure d/o (on Keppra), who presented after multiple seizures in setting of not taking his Keppra while on vacation. Ct head without acute actionable findings and labs non-actionable; neuro consulting and patient sent to CDU for monitoring.  No events overnight in CDU; this AM patient is resting comfortably in stretcher without complaints, no focal neurologic deficits, no tremors/convulsions. Will discuss with neuro (awaiting their final recommendations) but anticipate discharge with patient resuming his Keppra.

## 2022-10-23 NOTE — ED CDU PROVIDER DISPOSITION NOTE - PATIENT PORTAL LINK FT
You can access the FollowMyHealth Patient Portal offered by [facility] by registering at the following website: [email or web address]. By joining [facility]’s FollowMyHealth portal, you will also be able to view your health information using other applications (apps) compatible with our system.

## 2022-10-23 NOTE — ED CDU PROVIDER INITIAL DAY NOTE - OBJECTIVE STATEMENT
56 y/o M PMHx seizure d/o on Keppra 500mg BID who presents with multiple seizures.  Patient was traveling back from [place] with his wife, had a witnessed seizure that lasted for about 15 minutes, and then a subsequent seizure that lasted for about 10 minutes when he was being driven in the car, wife states sitting in the chair, no trauma or fall.  Patient with a third seizure lasted for less than a minute while being transported to the critical/recess room. Pt confused at time of arrival. On CDU PA eval pt awake and oriented, reports not taking his AEDs while on vacation, notes moderate generalized HA, denies any other complaints of pain or injury s/p seizure.  ED Course: WBC 12, pH 7.27 -> 7.29, UA trace blood, Utox negative, CTH no acute pathology, CXR no acute pathology. Pt given keppra load IV, evaluated by neurology who recommended resume AEDs, will d/w attending for further recs. Pt sent to CDU for frequent eval and monitoring for seizure and final neuroogy eval/recs.

## 2022-10-24 LAB
CULTURE RESULTS: SIGNIFICANT CHANGE UP
SPECIMEN SOURCE: SIGNIFICANT CHANGE UP

## 2022-10-25 ENCOUNTER — APPOINTMENT (OUTPATIENT)
Dept: NEUROLOGY | Facility: CLINIC | Age: 57
End: 2022-10-25

## 2022-10-25 VITALS
BODY MASS INDEX: 30.59 KG/M2 | HEART RATE: 103 BPM | DIASTOLIC BLOOD PRESSURE: 79 MMHG | RESPIRATION RATE: 16 BRPM | HEIGHT: 75 IN | WEIGHT: 246 LBS | SYSTOLIC BLOOD PRESSURE: 114 MMHG

## 2022-10-25 DIAGNOSIS — G47.51 CONFUSIONAL AROUSALS: ICD-10-CM

## 2022-10-25 LAB — LEVETIRACETAM SERPL-MCNC: <2 UG/ML — LOW (ref 10–40)

## 2022-10-25 PROCEDURE — 99213 OFFICE O/P EST LOW 20 MIN: CPT

## 2022-10-25 NOTE — CONSULT LETTER
[Dear  ___] : Dear  [unfilled], [Consult Letter:] : I had the pleasure of evaluating your patient, [unfilled]. [( Thank you for referring [unfilled] for consultation for _____ )] : Thank you for referring [unfilled] for consultation for [unfilled] [Please see my note below.] : Please see my note below. [Consult Closing:] : Thank you very much for allowing me to participate in the care of this patient.  If you have any questions, please do not hesitate to contact me. [Sincerely,] : Sincerely, [FreeTextEntry2] : Conor Last MD\par Address: [address] [place] [address] [FreeTextEntry3] : Khalif Bryan MD, YANDEL\par Board Certified: Neurology, Clinical Neurophysiology, Epilepsy\par , Department of Neurology\par Epilepsy Fellowship \par [facility]\par \par

## 2022-10-25 NOTE — DISCUSSION/SUMMARY
[Complex Partial] : complex partial [Idiopathic] : idiopathic  [Focal] : focal [Risks Associated with Driving/[place] Law] : As per my usual protocol, the patient was advised in regards to risks and driving privileges associated with the [place] Guidelines.  [Safety Recommendations] : The patient was advised in regards to the risk of seizures and general seizure safety recommendations including not to be bathing alone, climbing to high places and operating heavy machinery. [Compliance with Medications] : The importance of compliance with medications was reinforced. [Medication Side Effects] : High frequency and serious potential medication adverse effects were reviewed with the patient, including but not exclusive to psychiatric effects.  Information sheets on medication side effects were made available to the patient in our clinic.  The patient or advocate agrees to notify us for any concerns. [Sleep Hygiene/Sleep Disruption Risks] : Sleep hygiene and the risks of sleep disruption were discussed. [Risk of Death] : Risk of death associated with seizures / SUDEP was discussed. [Obtain Copies of Medical Records] : Patient was asked to obtain copies of pertinent prior medical records or studies [FreeTextEntry1] : \par Recurrent confusional episodes, amnesia.\par Concern for focal epilepsy.  Possible childhood seizures.\par DDx: seizures, TGA (less likely), sleep related disorder\par MRI/AEEG reportedly normal.\par 12/2020 EEG ? single T3 spike.\par CRISTAL per sleep study\par \par -consideration to change to alt ASM with longer 1/2 life ie ZNS for compliance, but h/o kidney stones.\par Other options ie LTG. PER\par -sleep med f/u, hygiene\par -p event HA.  rec naproxen   prn\par -seizure precautions, recommendation is not to drive\par -sz risks included death, accidental injury discussed with pt/salvador\par \par x time 21min\par RTC 6mo\par

## 2022-10-25 NOTE — HISTORY OF PRESENT ILLNESS
[FreeTextEntry1] : \par Updates: \par Multiple episodes of seizures - ERUM humming, nonresponsive, drooling.  This past weekend 10/2022 in ER at [facility] p vacation/flight.  Admitted issue with compliance reducing dose intermittently on own accord. \par Sleep study pending.\par Difficulty sleeping.\par more forgetful  for names.\par \par 3 other milder events per salvador in this past year\par \par reported GI complaints on LEV, taking less than expected intermittently.\par \par PMHX: \par 57 RH M initially seen with wife, Lenora.\par 6/2019 episode of amnesia in [place] x 5hrs, resolved spont.  Repeated question asking.\par x2 more times that year.\par 1 Mo later, episode of standing, not speaking, confused/disoriented.\par \par Ex:  In confused state, after getting out of train, possibly after arousal. Went to wrong/old job location, unexplained why.  \par \par Saw Neurologist Dr Barcenas then Dr Gilliam - EEG done x 24hrs - told unrevealing.  \par Started on LEV empirically to 500mg bid. \par \par 4-5 more incidents in last year subsequent to med adjustments, from sleep, confused state, speaking, disoriented, amnestic x few minutes.  Staring, lip smacking described at least once.  HA afterwards.\par Most recent 9/7/21, missed Rx. Also, missed Rx with event in 12/2020.\par Reports poor compliance with Rx.\par \par No ADR.  Occ HA.  \par \par SocHx:\par Works as .\par Was driving.\par Denies emotional trauma. \par No TOB.  Occ ETOH use socially. No drugs.\par \par PMHx:\par Events as child with possible staring spells/stiffening.\par no TBI, concussions, CNS infections.\par no FMHx szs.\par \par FmHx: \par Ma dc CAD, Fa dc colon ca\par brothers - COPD, CAD\par \par

## 2022-10-26 NOTE — ED POST DISCHARGE NOTE - DETAILS
10/26/22: Pt contacted, made aware of his subtherapeutic keppra. States he was not taking as prescribed, already followed up with his neurologist yesterday and had dose double. Advised to take as prescribed and continue outpt f/u. - Reji Rivas PA-C 4 = No assist / stand by assistance

## 2022-11-09 ENCOUNTER — APPOINTMENT (OUTPATIENT)
Dept: INTERNAL MEDICINE | Facility: CLINIC | Age: 57
End: 2022-11-09

## 2022-11-09 ENCOUNTER — NON-APPOINTMENT (OUTPATIENT)
Age: 57
End: 2022-11-09

## 2022-11-09 VITALS
SYSTOLIC BLOOD PRESSURE: 133 MMHG | TEMPERATURE: 98.3 F | HEART RATE: 65 BPM | OXYGEN SATURATION: 65 % | HEIGHT: 75 IN | BODY MASS INDEX: 31.11 KG/M2 | DIASTOLIC BLOOD PRESSURE: 86 MMHG | WEIGHT: 250.19 LBS

## 2022-11-09 VITALS — DIASTOLIC BLOOD PRESSURE: 76 MMHG | SYSTOLIC BLOOD PRESSURE: 122 MMHG

## 2022-11-09 DIAGNOSIS — Z80.9 FAMILY HISTORY OF MALIGNANT NEOPLASM, UNSPECIFIED: ICD-10-CM

## 2022-11-09 PROCEDURE — 93000 ELECTROCARDIOGRAM COMPLETE: CPT

## 2022-11-09 PROCEDURE — 99386 PREV VISIT NEW AGE 40-64: CPT | Mod: 25

## 2022-11-09 PROCEDURE — 36415 COLL VENOUS BLD VENIPUNCTURE: CPT

## 2022-11-09 NOTE — HISTORY OF PRESENT ILLNESS
[FreeTextEntry1] : establish\par his pcp retired [de-identified] : reviewed neuro eval--presumptive partial seizures--on keppra\par his last episode was on an airplane late oct\par covid vacced\par states compliance with keppra now\par he has not followed with pulm on sleep study

## 2022-11-09 NOTE — HEALTH RISK ASSESSMENT
[Patient reported colonoscopy was normal] : Patient reported colonoscopy was normal [With Significant Other] : lives with significant other [] :  [ColonoscopyDate] : 9/21

## 2022-11-11 LAB
APPEARANCE: CLEAR
BACTERIA: NEGATIVE
BILIRUBIN URINE: NEGATIVE
BLOOD URINE: NEGATIVE
CHOLEST SERPL-MCNC: 198 MG/DL
COLOR: NORMAL
ESTIMATED AVERAGE GLUCOSE: 126 MG/DL
GLUCOSE QUALITATIVE U: NEGATIVE
HBA1C MFR BLD HPLC: 6 %
HDLC SERPL-MCNC: 58 MG/DL
HYALINE CASTS: 0 /LPF
KETONES URINE: NEGATIVE
LDLC SERPL CALC-MCNC: 127 MG/DL
LEUKOCYTE ESTERASE URINE: NEGATIVE
MICROSCOPIC-UA: NORMAL
NITRITE URINE: NEGATIVE
NONHDLC SERPL-MCNC: 140 MG/DL
PH URINE: 6
PROTEIN URINE: NEGATIVE
RED BLOOD CELLS URINE: 0 /HPF
SPECIFIC GRAVITY URINE: 1.01
SQUAMOUS EPITHELIAL CELLS: 0 /HPF
TRIGL SERPL-MCNC: 63 MG/DL
TSH SERPL-ACNC: 2.6 UIU/ML
UROBILINOGEN URINE: NORMAL
WHITE BLOOD CELLS URINE: 0 /HPF

## 2022-11-16 ENCOUNTER — APPOINTMENT (OUTPATIENT)
Dept: ORTHOPEDIC SURGERY | Facility: CLINIC | Age: 57
End: 2022-11-16

## 2022-11-25 ENCOUNTER — APPOINTMENT (OUTPATIENT)
Dept: MRI IMAGING | Facility: CLINIC | Age: 57
End: 2022-11-25

## 2022-11-25 PROCEDURE — 70551 MRI BRAIN STEM W/O DYE: CPT

## 2022-11-25 PROCEDURE — 76377 3D RENDER W/INTRP POSTPROCES: CPT

## 2022-11-26 ENCOUNTER — TRANSCRIPTION ENCOUNTER (OUTPATIENT)
Age: 57
End: 2022-11-26

## 2022-11-30 ENCOUNTER — APPOINTMENT (OUTPATIENT)
Dept: INTERNAL MEDICINE | Facility: CLINIC | Age: 57
End: 2022-11-30

## 2022-12-01 ENCOUNTER — APPOINTMENT (OUTPATIENT)
Dept: ORTHOPEDIC SURGERY | Facility: CLINIC | Age: 57
End: 2022-12-01

## 2022-12-08 NOTE — ED ADULT NURSE NOTE - NS ED NURSE RECORD ANOTHER HT AND WT
Pt here for OB follow up.   No FM yet.   Preferred pharmacy verified.  Good Phone # [phone]  Contractions, LOF denied. VB today, dark brown color reports will notice when she wipes. Cramping this morning.     
S) Pt is a 38 y.o.   at 15w5d  gestation. Routine prenatal care today. Complains of cramping and spotting today. States the bleeding is brown in nature. Very concerned. Will do BSUS to verify IUP. Referral placed to Dr Hinson, has not yet been able to schedule. Office info provided- will call and make appt ASAP.      Fetal movement none yet appreciated  Cramping yes  VB old brown spotting  LOF no   Denies dysuria. Generally feels well today. Good self-care activities identified. Denies headaches, swelling, visual changes, or epigastric pain .     O) /66   Wt 222 lb 3.2 oz         Labs:       PNL: Not done yet       GCT: Not done yet        AFP: Not done yet       GBS: N/A       Pertinent ultrasound -        Scheduled for 2023    A) IUP at 15w5d       S=D    BSUS shows SLIUP, good fetal movement, grossly normal OMARI, and FHT's 155. Placenta appears anterior    Long conversation regarding causes of bleeding early in pregnancy. SAB precautions reviewed.  Reassurance provided by normal US today, but aware that things can change    She is to follow up in ED or clinic with increased bleeding, increased cramping, or other concerns  Recommend blood work ASAP         Patient Active Problem List    Diagnosis Date Noted    Supervision of other high risk pregnancies, second trimester 2022    History of delivery of macrosomal infant 2022    AMA (advanced maternal age) multigravida 35+, second trimester 2022          SVE: deferred       Chaperone offered: n/a         TDAP: no       FLU: no        BTL: no       :  n/a       C/S Consent:  n/a       IOL or C/S scheduled: no       LAST PAP: 2022- LGSIL, but negative HPV         P) s/s ptl vs general discomforts. Fetal movements reviewed. General ed and anticipatory guidance. Nutrition/exercise/vitamin. Plans breast Plans pp contraception- unsure  Continue PNV.   
Yes

## 2023-01-11 ENCOUNTER — APPOINTMENT (OUTPATIENT)
Dept: NEUROLOGY | Facility: CLINIC | Age: 58
End: 2023-01-11

## 2023-01-31 ENCOUNTER — APPOINTMENT (OUTPATIENT)
Dept: NEUROLOGY | Facility: CLINIC | Age: 58
End: 2023-01-31
Payer: COMMERCIAL

## 2023-01-31 VITALS
DIASTOLIC BLOOD PRESSURE: 77 MMHG | HEIGHT: 75 IN | HEART RATE: 71 BPM | WEIGHT: 248 LBS | BODY MASS INDEX: 30.84 KG/M2 | SYSTOLIC BLOOD PRESSURE: 115 MMHG

## 2023-01-31 PROCEDURE — 99213 OFFICE O/P EST LOW 20 MIN: CPT

## 2023-02-02 ENCOUNTER — APPOINTMENT (OUTPATIENT)
Dept: PULMONOLOGY | Facility: CLINIC | Age: 58
End: 2023-02-02
Payer: COMMERCIAL

## 2023-02-02 VITALS
HEIGHT: 75 IN | WEIGHT: 248 LBS | OXYGEN SATURATION: 96 % | BODY MASS INDEX: 30.84 KG/M2 | SYSTOLIC BLOOD PRESSURE: 120 MMHG | HEART RATE: 71 BPM | DIASTOLIC BLOOD PRESSURE: 80 MMHG

## 2023-02-02 DIAGNOSIS — G47.33 OBSTRUCTIVE SLEEP APNEA (ADULT) (PEDIATRIC): ICD-10-CM

## 2023-02-02 PROCEDURE — 99213 OFFICE O/P EST LOW 20 MIN: CPT | Mod: GC

## 2023-02-03 ENCOUNTER — TRANSCRIPTION ENCOUNTER (OUTPATIENT)
Age: 58
End: 2023-02-03

## 2023-02-03 PROBLEM — G47.33 OBSTRUCTIVE SLEEP APNEA, ADULT: Status: ACTIVE | Noted: 2022-03-01

## 2023-02-03 LAB — LEVETIRACETAM SERPL-MCNC: 8.5 UG/ML

## 2023-02-03 NOTE — PHYSICAL EXAM
[General Appearance - Well Developed] : well developed [General Appearance - In No Acute Distress] : no acute distress [Normal Conjunctiva] : the conjunctiva exhibited no abnormalities [III] : III [Neck Appearance] : the appearance of the neck was normal [Thyroid Diffuse Enlargement] : the thyroid was not enlarged [Heart Rate And Rhythm] : heart rate was normal and rhythm regular [Heart Sounds] : normal S1 and S2 [Heart Sounds Gallop] : no gallops [Murmurs] : no murmurs [Heart Sounds Pericardial Friction Rub] : no pericardial rub [Respiration, Rhythm And Depth] : normal respiratory rhythm and effort [Auscultation Breath Sounds / Voice Sounds] : lungs were clear to auscultation bilaterally [Bowel Sounds] : normal bowel sounds [Abdomen Tenderness] : non-tender [Abdomen Soft] : soft [] : no hepato-splenomegaly [Abdomen Mass (___ Cm)] : no abdominal mass palpated [Abdomen Hernia] : no hernia was discovered [Nail Clubbing] : no clubbing of the fingernails [Cyanosis, Localized] : no localized cyanosis [Skin Color & Pigmentation] : normal skin color and pigmentation [No Focal Deficits] : no focal deficits [Low Lying Soft Palate] : low lying soft palate [Enlarged Base of the Tongue] : enlargement of the base of the tongue [FreeTextEntry1] : No LE edema b/l.

## 2023-02-03 NOTE — CONSULT LETTER
[Dear  ___] : Dear  [unfilled], [Consult Letter:] : I had the pleasure of evaluating your patient, [unfilled]. [( Thank you for referring [unfilled] for consultation for _____ )] : Thank you for referring [unfilled] for consultation for [unfilled] [Please see my note below.] : Please see my note below. [Consult Closing:] : Thank you very much for allowing me to participate in the care of this patient.  If you have any questions, please do not hesitate to contact me. [Sincerely,] : Sincerely, [FreeTextEntry2] : Conor Last MD\par Address: [address] [place] [address] [FreeTextEntry3] : Khalif Bryan MD, YANDEL\par Board Certified: Neurology, Clinical Neurophysiology, Epilepsy\par , Department of Neurology\par Epilepsy Fellowship \par [facility]\par \par

## 2023-02-03 NOTE — DISCUSSION/SUMMARY
[Complex Partial] : complex partial [Idiopathic] : idiopathic  [Focal] : focal [Risks Associated with Driving/[place] Law] : As per my usual protocol, the patient was advised in regards to risks and driving privileges associated with the [place] Guidelines.  [Safety Recommendations] : The patient was advised in regards to the risk of seizures and general seizure safety recommendations including not to be bathing alone, climbing to high places and operating heavy machinery. [Compliance with Medications] : The importance of compliance with medications was reinforced. [Medication Side Effects] : High frequency and serious potential medication adverse effects were reviewed with the patient, including but not exclusive to psychiatric effects.  Information sheets on medication side effects were made available to the patient in our clinic.  The patient or advocate agrees to notify us for any concerns. [Sleep Hygiene/Sleep Disruption Risks] : Sleep hygiene and the risks of sleep disruption were discussed. [Risk of Death] : Risk of death associated with seizures / SUDEP was discussed. [Obtain Copies of Medical Records] : Patient was asked to obtain copies of pertinent prior medical records or studies [FreeTextEntry1] : \par Recurrent confusional episodes, amnesia.\par Concern for focal epilepsy.  Possible childhood seizures.\par DDx: seizures, TGA (less likely), sleep related disorder\par MRI/AEEG reportedly normal.\par 12/2020 EEG ? single T3 spike.\par CRISTAL per sleep study\par \par -consideration to change to alt ASM with longer 1/2 life ie ZNS for compliance, but h/o kidney stones.\par Other options ie LTG. PER\par -sleep med f/u, hygiene\par -p event HA.  rec naproxen   prn\par -seizure precautions, recommendation is not to drive\par -sz risks included death, accidental injury discussed with pt/salvador\par -LEV level, consider increase in dose.\par \par x time 21min\par RTC 6mo\par

## 2023-02-03 NOTE — ASSESSMENT
[FreeTextEntry1] : 57 yo M w/ epilepsy and severe CRISTAL (AHI of 36.2, and T-90 of 7.3% (isaias 78%)) here for CPAP titration. \par \par #Severe CRISTAL with nocturnal desaturation\par -AHI of 36.2, and T-90 of 7.3% (isaias 78%)\par -No clear evidence of parasomnias during in-lab sleep study. \par -Discussed at length the risk of nocturnal hypoxemia and seizures. Also discussed how CPAP therapy can prevent hypoxemia related seizure events during sleep. \par -Patient agreeable to in-lab CPAP titration study.\par -RTC after study accomplished for treatment initiation\par The ramifications of obstructive sleep apnea and its potential therapeutic modalities were discussed with the patient. .

## 2023-02-03 NOTE — HISTORY OF PRESENT ILLNESS
[FreeTextEntry1] : \par Updates:  No recent episodes, no ADRs on LEV low dose\par \par 10/2022  Multiple episodes of seizures - ERUM humming, nonresponsive, drooling.  In ER at [facility] p vacation/flight.  Admitted issue with compliance reducing dose intermittently on own accord. \par \par Sleep study c/w CRISTAL.  Difficulty sleeping.\par More forgetful  for names.  remote memory is good.\par \par 3 other milder events per salvador in this past year\par \par reported GI complaints on LEV, taking less than expected intermittently.\par \par PMHX: \par 57 RH M initially seen with wife, Lenora.\par 6/2019 episode of amnesia in [place] x 5hrs, resolved spont.  Repeated question asking.\par x2 more times that year.\par 1 Mo later, episode of standing, not speaking, confused/disoriented.\par \par Ex:  In confused state, after getting out of train, possibly after arousal. Went to wrong/old job location, unexplained why.  \par \par Saw Neurologist Dr Barcenas then Dr Gilliam - EEG done x 24hrs - told unrevealing.  \par Started on LEV empirically to 500mg bid. \par \par 4-5 more incidents in last year subsequent to med adjustments, from sleep, confused state, speaking, disoriented, amnestic x few minutes.  Staring, lip smacking described at least once.  HA afterwards.\par Most recent 9/7/21, missed Rx. Also, missed Rx with event in 12/2020.\par Reports poor compliance with Rx.\par \par No ADR.  Occ HA.  \par \par SocHx:\par Works as .\par Was driving.\par Denies emotional trauma. \par No TOB.  Occ ETOH use socially. No drugs.\par \par PMHx:\par Events as child with possible staring spells/stiffening.\par no TBI, concussions, CNS infections.\par no FMHx szs.\par \par FmHx: \par Ma dc CAD, Fa dc colon ca\par brothers - COPD, CAD\par \par

## 2023-02-03 NOTE — HISTORY OF PRESENT ILLNESS
[Obstructive Sleep Apnea] : obstructive sleep apnea [FreeTextEntry1] : 57 yo M w/ epilepsy and severe CRISTAL (AHI of 36.2, and T-90 of 7.3% (isaias 78%)) here for CPAP titration. Since patient last saw us in clinic a year ago. He has been evaluated by neurology and deemed to have a seizure disorder with suspicion that his underlying amnesia episodes and confusion were likely related to his seizure disorder. His seizures have been well controlled except for one episode of noncompliance on a plane 3 months ago. He was drinking etoh and had a seizure during sleep. Endorses bedtime of 1030PM to 11PM. Sleep latency of 30 minutes. Endorses waketime of 440AM for work. Reports not feeling tired during the day. Feels generally refreshed after sleep. Wakes up throughout the night once per night to urinate but is able to fall back asleep after about 30 minutes. Wakes up feeling refreshed. Endorses snoring. Denies any witnessed apneas, sleepwalking, sleeptalking, hallucinations, restless leg symptoms, prior head trauma or concussions, incident febrile illness. Endorses headaches several times a week. \par \par Patient currently works as an . Denies any toxic habits except for occasional ETOH use.

## 2023-07-12 ENCOUNTER — INPATIENT (INPATIENT)
Facility: HOSPITAL | Age: 58
LOS: 1 days | Discharge: HOME | DRG: 101 | End: 2023-07-14
Attending: PSYCHIATRY & NEUROLOGY | Admitting: PSYCHIATRY & NEUROLOGY
Payer: COMMERCIAL

## 2023-07-12 VITALS
DIASTOLIC BLOOD PRESSURE: 85 MMHG | SYSTOLIC BLOOD PRESSURE: 143 MMHG | TEMPERATURE: 99 F | WEIGHT: 244.93 LBS | RESPIRATION RATE: 18 BRPM | HEIGHT: 75 IN | OXYGEN SATURATION: 94 % | HEART RATE: 105 BPM

## 2023-07-12 DIAGNOSIS — R56.9 UNSPECIFIED CONVULSIONS: ICD-10-CM

## 2023-07-12 LAB
ALBUMIN SERPL ELPH-MCNC: 4.6 G/DL — SIGNIFICANT CHANGE UP (ref 3.3–5)
ALP SERPL-CCNC: 55 U/L — SIGNIFICANT CHANGE UP (ref 40–120)
ALT FLD-CCNC: 26 U/L — SIGNIFICANT CHANGE UP (ref 10–45)
AMPHET UR-MCNC: NEGATIVE — SIGNIFICANT CHANGE UP
ANION GAP SERPL CALC-SCNC: 11 MMOL/L — SIGNIFICANT CHANGE UP (ref 5–17)
APPEARANCE UR: ABNORMAL
AST SERPL-CCNC: 29 U/L — SIGNIFICANT CHANGE UP (ref 10–40)
BACTERIA # UR AUTO: NEGATIVE — SIGNIFICANT CHANGE UP
BARBITURATES UR SCN-MCNC: NEGATIVE — SIGNIFICANT CHANGE UP
BASE EXCESS BLDV CALC-SCNC: 1 MMOL/L — SIGNIFICANT CHANGE UP (ref -2–3)
BASOPHILS # BLD AUTO: 0.04 K/UL — SIGNIFICANT CHANGE UP (ref 0–0.2)
BASOPHILS NFR BLD AUTO: 0.3 % — SIGNIFICANT CHANGE UP (ref 0–2)
BENZODIAZ UR-MCNC: NEGATIVE — SIGNIFICANT CHANGE UP
BILIRUB SERPL-MCNC: 0.4 MG/DL — SIGNIFICANT CHANGE UP (ref 0.2–1.2)
BILIRUB UR-MCNC: NEGATIVE — SIGNIFICANT CHANGE UP
BUN SERPL-MCNC: 22 MG/DL — SIGNIFICANT CHANGE UP (ref 7–23)
CA-I SERPL-SCNC: 1.16 MMOL/L — SIGNIFICANT CHANGE UP (ref 1.15–1.33)
CALCIUM SERPL-MCNC: 9.3 MG/DL — SIGNIFICANT CHANGE UP (ref 8.4–10.5)
CHLORIDE BLDV-SCNC: 103 MMOL/L — SIGNIFICANT CHANGE UP (ref 96–108)
CHLORIDE SERPL-SCNC: 104 MMOL/L — SIGNIFICANT CHANGE UP (ref 96–108)
CO2 BLDV-SCNC: 29 MMOL/L — HIGH (ref 22–26)
CO2 SERPL-SCNC: 24 MMOL/L — SIGNIFICANT CHANGE UP (ref 22–31)
COCAINE METAB.OTHER UR-MCNC: NEGATIVE — SIGNIFICANT CHANGE UP
COLOR SPEC: YELLOW — SIGNIFICANT CHANGE UP
CREAT SERPL-MCNC: 1.08 MG/DL — SIGNIFICANT CHANGE UP (ref 0.5–1.3)
DIFF PNL FLD: NEGATIVE — SIGNIFICANT CHANGE UP
EGFR: 79 ML/MIN/1.73M2 — SIGNIFICANT CHANGE UP
EOSINOPHIL # BLD AUTO: 0.01 K/UL — SIGNIFICANT CHANGE UP (ref 0–0.5)
EOSINOPHIL NFR BLD AUTO: 0.1 % — SIGNIFICANT CHANGE UP (ref 0–6)
EPI CELLS # UR: 0 /HPF — SIGNIFICANT CHANGE UP
ETHANOL SERPL-MCNC: <10 MG/DL — SIGNIFICANT CHANGE UP (ref 0–10)
GAS PNL BLDV: 136 MMOL/L — SIGNIFICANT CHANGE UP (ref 136–145)
GAS PNL BLDV: SIGNIFICANT CHANGE UP
GAS PNL BLDV: SIGNIFICANT CHANGE UP
GLUCOSE BLDV-MCNC: 144 MG/DL — HIGH (ref 70–99)
GLUCOSE SERPL-MCNC: 145 MG/DL — HIGH (ref 70–99)
GLUCOSE UR QL: NEGATIVE — SIGNIFICANT CHANGE UP
HCO3 BLDV-SCNC: 28 MMOL/L — SIGNIFICANT CHANGE UP (ref 22–29)
HCT VFR BLD CALC: 46.9 % — SIGNIFICANT CHANGE UP (ref 39–50)
HCT VFR BLDA CALC: 47 % — SIGNIFICANT CHANGE UP (ref 39–51)
HGB BLD CALC-MCNC: 15.6 G/DL — SIGNIFICANT CHANGE UP (ref 12.6–17.4)
HGB BLD-MCNC: 15.4 G/DL — SIGNIFICANT CHANGE UP (ref 13–17)
HYALINE CASTS # UR AUTO: 2 /LPF — SIGNIFICANT CHANGE UP (ref 0–2)
IMM GRANULOCYTES NFR BLD AUTO: 1 % — HIGH (ref 0–0.9)
KETONES UR-MCNC: NEGATIVE — SIGNIFICANT CHANGE UP
LACTATE BLDV-MCNC: 1.8 MMOL/L — SIGNIFICANT CHANGE UP (ref 0.5–2)
LEUKOCYTE ESTERASE UR-ACNC: NEGATIVE — SIGNIFICANT CHANGE UP
LYMPHOCYTES # BLD AUTO: 0.7 K/UL — LOW (ref 1–3.3)
LYMPHOCYTES # BLD AUTO: 5.2 % — LOW (ref 13–44)
MAGNESIUM SERPL-MCNC: 2.3 MG/DL — SIGNIFICANT CHANGE UP (ref 1.6–2.6)
MCHC RBC-ENTMCNC: 29.7 PG — SIGNIFICANT CHANGE UP (ref 27–34)
MCHC RBC-ENTMCNC: 32.8 GM/DL — SIGNIFICANT CHANGE UP (ref 32–36)
MCV RBC AUTO: 90.5 FL — SIGNIFICANT CHANGE UP (ref 80–100)
METHADONE UR-MCNC: NEGATIVE — SIGNIFICANT CHANGE UP
MONOCYTES # BLD AUTO: 0.56 K/UL — SIGNIFICANT CHANGE UP (ref 0–0.9)
MONOCYTES NFR BLD AUTO: 4.2 % — SIGNIFICANT CHANGE UP (ref 2–14)
NEUTROPHILS # BLD AUTO: 12.03 K/UL — HIGH (ref 1.8–7.4)
NEUTROPHILS NFR BLD AUTO: 89.2 % — HIGH (ref 43–77)
NITRITE UR-MCNC: NEGATIVE — SIGNIFICANT CHANGE UP
NRBC # BLD: 0 /100 WBCS — SIGNIFICANT CHANGE UP (ref 0–0)
OPIATES UR-MCNC: POSITIVE
OXYCODONE UR-MCNC: NEGATIVE — SIGNIFICANT CHANGE UP
PCO2 BLDV: 50 MMHG — SIGNIFICANT CHANGE UP (ref 42–55)
PCP SPEC-MCNC: SIGNIFICANT CHANGE UP
PCP UR-MCNC: NEGATIVE — SIGNIFICANT CHANGE UP
PH BLDV: 7.35 — SIGNIFICANT CHANGE UP (ref 7.32–7.43)
PH UR: 6 — SIGNIFICANT CHANGE UP (ref 5–8)
PHOSPHATE SERPL-MCNC: 2.1 MG/DL — LOW (ref 2.5–4.5)
PLATELET # BLD AUTO: 258 K/UL — SIGNIFICANT CHANGE UP (ref 150–400)
PO2 BLDV: 22 MMHG — LOW (ref 25–45)
POTASSIUM BLDV-SCNC: 4.3 MMOL/L — SIGNIFICANT CHANGE UP (ref 3.5–5.1)
POTASSIUM SERPL-MCNC: 4.1 MMOL/L — SIGNIFICANT CHANGE UP (ref 3.5–5.3)
POTASSIUM SERPL-SCNC: 4.1 MMOL/L — SIGNIFICANT CHANGE UP (ref 3.5–5.3)
PROLACTIN SERPL-MCNC: 14.1 NG/ML — SIGNIFICANT CHANGE UP (ref 4.1–18.4)
PROT SERPL-MCNC: 7.6 G/DL — SIGNIFICANT CHANGE UP (ref 6–8.3)
PROT UR-MCNC: ABNORMAL
RBC # BLD: 5.18 M/UL — SIGNIFICANT CHANGE UP (ref 4.2–5.8)
RBC # FLD: 13.5 % — SIGNIFICANT CHANGE UP (ref 10.3–14.5)
RBC CASTS # UR COMP ASSIST: 2 /HPF — SIGNIFICANT CHANGE UP (ref 0–4)
SAO2 % BLDV: 31.7 % — LOW (ref 67–88)
SODIUM SERPL-SCNC: 139 MMOL/L — SIGNIFICANT CHANGE UP (ref 135–145)
SP GR SPEC: 1.03 — HIGH (ref 1.01–1.02)
THC UR QL: NEGATIVE — SIGNIFICANT CHANGE UP
UROBILINOGEN FLD QL: NEGATIVE — SIGNIFICANT CHANGE UP
WBC # BLD: 13.47 K/UL — HIGH (ref 3.8–10.5)
WBC # FLD AUTO: 13.47 K/UL — HIGH (ref 3.8–10.5)
WBC UR QL: 2 /HPF — SIGNIFICANT CHANGE UP (ref 0–5)

## 2023-07-12 PROCEDURE — 99223 1ST HOSP IP/OBS HIGH 75: CPT

## 2023-07-12 PROCEDURE — 73030 X-RAY EXAM OF SHOULDER: CPT | Mod: 26,LT,77

## 2023-07-12 PROCEDURE — 99285 EMERGENCY DEPT VISIT HI MDM: CPT | Mod: 57

## 2023-07-12 PROCEDURE — 73030 X-RAY EXAM OF SHOULDER: CPT | Mod: 26,LT

## 2023-07-12 PROCEDURE — 23650 CLTX SHO DSLC W/MNPJ WO ANES: CPT | Mod: 54

## 2023-07-12 PROCEDURE — 95720 EEG PHY/QHP EA INCR W/VEEG: CPT

## 2023-07-12 PROCEDURE — 71045 X-RAY EXAM CHEST 1 VIEW: CPT | Mod: 26

## 2023-07-12 PROCEDURE — 70450 CT HEAD/BRAIN W/O DYE: CPT | Mod: 26

## 2023-07-12 RX ORDER — BRIVARACETAM 25 MG/1
50 TABLET, FILM COATED ORAL ONCE
Refills: 0 | Status: DISCONTINUED | OUTPATIENT
Start: 2023-07-12 | End: 2023-07-14

## 2023-07-12 RX ORDER — ENOXAPARIN SODIUM 100 MG/ML
40 INJECTION SUBCUTANEOUS EVERY 24 HOURS
Refills: 0 | Status: DISCONTINUED | OUTPATIENT
Start: 2023-07-12 | End: 2023-07-14

## 2023-07-12 RX ORDER — MIDAZOLAM HYDROCHLORIDE 1 MG/ML
2 INJECTION, SOLUTION INTRAMUSCULAR; INTRAVENOUS ONCE
Refills: 0 | Status: DISCONTINUED | OUTPATIENT
Start: 2023-07-12 | End: 2023-07-12

## 2023-07-12 RX ORDER — SODIUM,POTASSIUM PHOSPHATES 278-250MG
1 POWDER IN PACKET (EA) ORAL ONCE
Refills: 0 | Status: DISCONTINUED | OUTPATIENT
Start: 2023-07-12 | End: 2023-07-12

## 2023-07-12 RX ORDER — MORPHINE SULFATE 50 MG/1
4 CAPSULE, EXTENDED RELEASE ORAL ONCE
Refills: 0 | Status: DISCONTINUED | OUTPATIENT
Start: 2023-07-12 | End: 2023-07-12

## 2023-07-12 RX ORDER — MORPHINE SULFATE 50 MG/1
2 CAPSULE, EXTENDED RELEASE ORAL ONCE
Refills: 0 | Status: DISCONTINUED | OUTPATIENT
Start: 2023-07-12 | End: 2023-07-12

## 2023-07-12 RX ORDER — LEVETIRACETAM 250 MG/1
1000 TABLET, FILM COATED ORAL ONCE
Refills: 0 | Status: DISCONTINUED | OUTPATIENT
Start: 2023-07-12 | End: 2023-07-12

## 2023-07-12 RX ORDER — ACETAMINOPHEN 500 MG
1000 TABLET ORAL ONCE
Refills: 0 | Status: COMPLETED | OUTPATIENT
Start: 2023-07-12 | End: 2023-07-12

## 2023-07-12 RX ORDER — SODIUM,POTASSIUM PHOSPHATES 278-250MG
1 POWDER IN PACKET (EA) ORAL ONCE
Refills: 0 | Status: COMPLETED | OUTPATIENT
Start: 2023-07-12 | End: 2023-07-12

## 2023-07-12 RX ORDER — LEVETIRACETAM 250 MG/1
500 TABLET, FILM COATED ORAL
Refills: 0 | Status: DISCONTINUED | OUTPATIENT
Start: 2023-07-12 | End: 2023-07-12

## 2023-07-12 RX ORDER — FENTANYL CITRATE 50 UG/ML
50 INJECTION INTRAVENOUS ONCE
Refills: 0 | Status: DISCONTINUED | OUTPATIENT
Start: 2023-07-12 | End: 2023-07-12

## 2023-07-12 RX ADMIN — MORPHINE SULFATE 4 MILLIGRAM(S): 50 CAPSULE, EXTENDED RELEASE ORAL at 06:56

## 2023-07-12 RX ADMIN — MORPHINE SULFATE 4 MILLIGRAM(S): 50 CAPSULE, EXTENDED RELEASE ORAL at 08:28

## 2023-07-12 RX ADMIN — FENTANYL CITRATE 50 MICROGRAM(S): 50 INJECTION INTRAVENOUS at 17:21

## 2023-07-12 RX ADMIN — Medication 1000 MILLIGRAM(S): at 14:29

## 2023-07-12 RX ADMIN — MORPHINE SULFATE 4 MILLIGRAM(S): 50 CAPSULE, EXTENDED RELEASE ORAL at 07:15

## 2023-07-12 RX ADMIN — LEVETIRACETAM 500 MILLIGRAM(S): 250 TABLET, FILM COATED ORAL at 08:28

## 2023-07-12 RX ADMIN — MORPHINE SULFATE 2 MILLIGRAM(S): 50 CAPSULE, EXTENDED RELEASE ORAL at 18:48

## 2023-07-12 RX ADMIN — Medication 1 PACKET(S): at 11:51

## 2023-07-12 RX ADMIN — MORPHINE SULFATE 4 MILLIGRAM(S): 50 CAPSULE, EXTENDED RELEASE ORAL at 07:58

## 2023-07-12 RX ADMIN — MIDAZOLAM HYDROCHLORIDE 2 MILLIGRAM(S): 1 INJECTION, SOLUTION INTRAMUSCULAR; INTRAVENOUS at 17:19

## 2023-07-12 RX ADMIN — Medication 400 MILLIGRAM(S): at 07:54

## 2023-07-12 NOTE — ED ADULT TRIAGE NOTE - CHIEF COMPLAINT QUOTE
possible unwitnessed seizure activity x this AM. Found by family.   Pt noted w bottom lip wound, tongue wound and L shoulder pain.  on Keppra 1G daily. Hx absent sz. possible unwitnessed seizure activity x this AM. Found by family.   Pt noted w bottom lip wound, tongue wound and L shoulder pain.  unknown head strike.   on Keppra 1G daily. Hx absent sz.

## 2023-07-12 NOTE — CONSULT NOTE ADULT - SUBJECTIVE AND OBJECTIVE BOX
Neurology - Consult Note    -  Spectra: [id number] ([facility]), [phone] ([facility])  -    HPI: Patient ROXY PENN is a 58y  man with a PMHx significant for R shoulder capsulitis/tendonitis, CRISTAL,     Initially in 6/2019 had an episode of amnesia in [place] lasting 5 hours. Had repeated question asking, had two more episodes that year. And then one month later, episode of standing but not speaking, disoriented/confused. In past has gone to wrong/old job location while in this confused state. Saw Neurologists Dr. Barcenas, then Dr. Henderson - EEG x 24 unrevealing. Started on Keppra empirically. Has at time had poor adherence with medications, and unclear if provoking further episodes. In Oct 2022, had multiple episodes of seizures (focal impaired awareness) becoming nonresponsive, drooling. At that time had issue with compliance, was intermittently decreasing Keppra dose. Sleep study consistent with CRISTAL    pmhx events as child with possible staring spells/stiffining, no TBI, concussions, CNS infxns, or family hx of seizures    Review of Systems:  INCOMPLETE   CONSTITUTIONAL: No fevers or chills  EYES AND ENT: No visual changes or no throat pain   NECK: No pain or stiffness  RESPIRATORY: No hemoptysis or shortness of breath  CARDIOVASCULAR: No chest pain or palpitations  GASTROINTESTINAL: No melena or hematochezia  GENITOURINARY: No dysuria or hematuria  NEUROLOGICAL: +As stated in HPI above  SKIN: No itching, burning, rashes, or lesions   All other review of systems is negative unless indicated above.    Allergies:  No Known Allergies      PMHx/PSHx/Family Hx: As above, otherwise see below   Seizure        Social Hx:  No current use of tobacco, alcohol, or illicit drugs  Lives with ***    Medications:  MEDICATIONS  (STANDING):    MEDICATIONS  (PRN):      Vitals:  T(C): 36.6 (07-12-23 @ 06:50), Max: 37.1 (07-12-23 @ 06:25)  HR: 96 (07-12-23 @ 06:50) (96 - 105)  BP: 157/86 (07-12-23 @ 06:50) (143/85 - 157/86)  RR: 18 (07-12-23 @ 06:50) (18 - 18)  SpO2: 94% (07-12-23 @ 06:50) (94% - 94%)      Labs:          CAPILLARY BLOOD GLUCOSE      POCT Blood Glucose.: 145 mg/dL (12 Jul 2023 06:40)          CSF:                  Radiology:

## 2023-07-12 NOTE — ED ADULT NURSE NOTE - OBJECTIVE STATEMENT
58y male A&OX4 coming in through triage complaining of possible seizures. PMHX seizures on Keppra. Pt reports only remembering hearing the alarm and waking up. Pt wife states they heard noises when he was in the shower. Pt states he felt his left arm in pain. Pt has pulses on arms b/l. Pt was placed on monitor and is SR. Pt denies chest pain, shortness of breath, abd pain, N/V/D, fever, cough. Labs was drawn and sent to lab. Pt is neurologically intact. Pt pending dispo.

## 2023-07-12 NOTE — CONSULT NOTE ADULT - ASSESSMENT
Patient will be discussed with neuro attending. Recommendations incomplete until signed by neuro attending.

## 2023-07-12 NOTE — ED PROVIDER NOTE - OBJECTIVE STATEMENT
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  Patient woke this morning, family member heard loud noises like crashing sounds in bathroom, Went downstairs saw patient in basement who had should have left for work by that time.  When asked what they are doing patient was confused did not know why they were there, where they were, or what had just happened.  Per wife patient history of absence seizures, Keppra 1000 mg twice daily, patient noted to be confused from baseline per wife, noted to have bit his tongue and his lip.  Patient does not remember events of injury/fall.  Complaining of pain in the left shoulder, 9 out of 10, holding arm to chest to limit range of motion.  Per wife patient stoic and never complains of pain.  Denies headache, neck pain, CP, SOB, abdominal pain nausea/vomiting/diarrhea/dysuria, back pain, fever/chills.

## 2023-07-12 NOTE — H&P ADULT - NSCORESITESY/N_GEN_A_CORE_RD
No Patient comes in with chest pain, sob, and lethargy that started 2 weeks ago. Patient states its worsening every day. Hx asthma, bipolar, schizophrenia.

## 2023-07-12 NOTE — H&P ADULT - NSHPLABSRESULTS_GEN_ALL_CORE
LABS:  cret                        15.4   13.47 )-----------( 258      ( 12 Jul 2023 07:12 )             46.9     07-12    139  |  104  |  22  ----------------------------<  145<H>  4.1   |  24  |  1.08    Ca    9.3      12 Jul 2023 07:12  Phos  2.1     07-12  Mg     2.3     07-12    TPro  7.6  /  Alb  4.6  /  TBili  0.4  /  DBili  x   /  AST  29  /  ALT  26  /  AlkPhos  55  07-12    XR Left Shoulder:    IMPRESSION:  No acute fracture or dislocation.    Admission EKG: FL Interval 204 LABS:  cret                        15.4   13.47 )-----------( 258      ( 12 Jul 2023 07:12 )             46.9     07-12    139  |  104  |  22  ----------------------------<  145<H>  4.1   |  24  |  1.08    Ca    9.3      12 Jul 2023 07:12  Phos  2.1     07-12  Mg     2.3     07-12    TPro  7.6  /  Alb  4.6  /  TBili  0.4  /  DBili  x   /  AST  29  /  ALT  26  /  AlkPhos  55  07-12    XR Left Shoulder:    IMPRESSION:  No acute fracture or dislocation.    CXR:  IMPRESSION:    Clear lungs.    Admission EKG: LA Interval 204 LABS:  cret                        15.4   13.47 )-----------( 258      ( 12 Jul 2023 07:12 )             46.9     07-12    139  |  104  |  22  ----------------------------<  145<H>  4.1   |  24  |  1.08    Ca    9.3      12 Jul 2023 07:12  Phos  2.1     07-12  Mg     2.3     07-12    TPro  7.6  /  Alb  4.6  /  TBili  0.4  /  DBili  x   /  AST  29  /  ALT  26  /  AlkPhos  55  07-12    XR Left Shoulder    IMPRESSION:  Posteriorly dislocated right shoulder with deep engaging reverse   Hill-Sachs deformity along anterior left humeral head margin.    No associated displaced bone fracture fragments.  Intact and aligned remaining osteoarticular structures.  Preserved AC joint space.  No destructive osseous lesions or periosteal reaction.    No periarticular soft tissue calcifications.    CXR:  IMPRESSION:    Clear lungs.    Admission EKG: SD Interval 204

## 2023-07-12 NOTE — H&P ADULT - HISTORY OF PRESENT ILLNESS
Pt is 58yoM pmhx R shoulder pain, CRISTAL, history of possible seizures (not EEG confirmed, pt takes standing Keppra 500 BID, follows Dr. Bryan), presents after possible seizure episode this morning, accompanied by L shoulder pain.    Per pt, this AM he woke up with L shoulder pain 9/10, exacerbated by movement, nonradiating), took shower, and went downstairs to get ready for work day. During this time pt felt disoriented about where he works at this time, but no lapses in memory. Daughter heard loud noise, and found dad sitting downstairs clutching his L shoulder. Pt unsure if he took his Keppra the night before admission, but confirms that he took it that morning. He reports good adherence to Keppra generally. Had two drinks of beer on night prior to admission. Pt does not report any headstrike. Pt's usual "seizure" episodes involves disorientation regarding work and tasks for the day with lapses in memory, but pt retains awareness of self and family. He has had 8 of these episodes in the past 5 years - no confirmed EEG evidence that these episodes were seizures. MRI Brain in 11/2022 showing nonspecific T2 FLAIR hyperintensities in Right Frontal Lobe, with no abnormal mesial temporal findings. Pt's last episode was January 2023 - according to wife, there was a hole in wall and cut in pt's chin, and pt did not remember how the hole/cut got there. Pt also felt disoriented during this episode. Of note, as a child (<3 years old), pt was informed by his mother that he had few episodes of his eyes rolling back and him becoming unresponsive, which resolved after cold water splashed on head. This only happened a few times in childhood and never recurred in adolescence or adulthood. No family history of seizures. Pt denies fever, chills, weakness, numbness/tingling, headache, tremors, chest pain, dyspnea, palpitations, n/v/c/d, or urinary symptoms. Pt is 58yoM pmhx R shoulder pain, CRISTAL, history of possible seizures (not EEG confirmed, pt takes standing Keppra 500 BID, follows Dr. Bryan), presents after possible seizure episode this morning, accompanied by L shoulder pain.    Per pt, this AM he woke up with L shoulder pain (9/10, exacerbated by movement, nonradiating), took shower, and went downstairs to get ready for work day. During this time pt felt disoriented about where he works at this time, but no lapses in memory. Daughter heard loud noise, and found dad sitting downstairs clutching his L shoulder. Pt unsure if he took his Keppra the night before admission, but confirms that he took it that morning. He reports good adherence to Keppra generally. Had two drinks of beer on night prior to admission. Pt does not report any headstrike. Pt's usual "seizure" episodes involves disorientation regarding work and tasks for the day with lapses in memory, but pt retains awareness of self and family. He has had 8 of these episodes in the past 5 years - no confirmed EEG evidence that these episodes were seizures. MRI Brain in 11/2022 showing nonspecific T2 FLAIR hyperintensities in Right Frontal Lobe, with no abnormal mesial temporal findings. Pt's last episode was January 2023 - according to wife, there was a hole in wall and cut in pt's chin, and pt did not remember how the hole/cut got there. Pt also felt disoriented during this episode. Of note, as a child (<3 years old), pt was informed by his mother that he had few episodes of his eyes rolling back and him becoming unresponsive, which resolved after cold water splashed on head. This only happened a few times in childhood and never recurred in adolescence or adulthood. No family history of seizures. Pt denies fever, chills, weakness, numbness/tingling, headache, tremors, chest pain, dyspnea, palpitations, n/v/c/d, or urinary symptoms. Pt is 58yoM pmhx R shoulder pain, CRISTAL, history of possible seizures (not EEG confirmed, pt takes standing Keppra 500 BID, follows Dr. Bryan), presents after possible seizure episode this morning, accompanied by L shoulder pain.    Per pt, this AM he woke up with L shoulder pain (9/10, exacerbated by movement, nonradiating), took shower, and went downstairs to get ready for work day. During this time pt felt disoriented for 30 minutes, during which he was unsure where he was supposed to go for work, but no lapses in memory this AM. Daughter heard loud noise, and found dad sitting downstairs clutching his L shoulder. Pt unsure if he took his Keppra the night before admission, but confirms that he took it that morning. He reports good adherence to Keppra generally. Had two drinks of beer on night prior to admission. Pt does not report any headstrike. Pt's usual "seizure" episodes involves disorientation regarding work and tasks for the day with lapses in memory, but pt retains awareness of self and family. He has had 8 of these episodes in the past 5 years - no confirmed EEG evidence that these episodes were seizures. MRI Brain in 11/2022 showing nonspecific T2 FLAIR hyperintensities in Right Frontal Lobe, with no abnormal mesial temporal findings. Pt's last episode was January 2023 - according to wife, there was a hole in wall and cut in pt's chin, and pt did not remember how the hole/cut got there. Pt also felt disoriented during this episode. Of note, as a child (<3 years old), pt was informed by his mother that he had few episodes of his eyes rolling back and him becoming unresponsive, which resolved after cold water splashed on head. This only happened a few times in childhood and never recurred in adolescence or adulthood. No family history of seizures. Pt denies fever, chills, weakness, numbness/tingling, headache, tremors, chest pain, dyspnea, palpitations, n/v/c/d, or urinary symptoms.

## 2023-07-12 NOTE — H&P ADULT - NSHPREVIEWOFSYSTEMS_GEN_ALL_CORE
REVIEW OF SYSTEMS:    CONSTITUTIONAL: No weakness, fevers or chills  EYES/ENT: No visual changes;  No vertigo or throat pain   NECK: No pain or stiffness  RESPIRATORY: No cough, wheezing, hemoptysis; No shortness of breath  CARDIOVASCULAR: No chest pain or palpitations  GASTROINTESTINAL: No abdominal or epigastric pain. No nausea, vomiting, or hematemesis; No diarrhea or constipation. No melena or hematochezia.  GENITOURINARY: No dysuria or hematuria  NEUROLOGICAL: No numbness or weakness  SKIN: No itching, rashes  HEME: No bruising or bleeding  ENDOCRINE: No polyuria or polydipsia

## 2023-07-12 NOTE — ED PROVIDER NOTE - CLINICAL SUMMARY MEDICAL DECISION MAKING FREE TEXT BOX
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  Patient woke this morning, family member heard loud noises like crashing sounds in bathroom, Went downstairs saw patient in basement who had should have left for work by that time.  When asked what they are doing patient was confused did not know why they were there, where they were, or what had just happened.  Per wife patient history of absence seizures, Keppra 1000 mg twice daily, patient noted to be confused from baseline per wife, noted to have bit his tongue and his lip.  Patient does not remember events of injury/fall.  Complaining of pain in the left shoulder, 9 out of 10, holding arm to chest to limit range of motion.  Per wife patient stoic and never complains of pain.  Denies headache, neck pain, CP, SOB, abdominal pain nausea/vomiting/diarrhea/dysuria, back pain, fever/chills.  AVSS, ANO 3, PERRLA, EOMI, CN II through XII intact, no focal neurodeficits, + left shoulder ROM limited secondary to pain, + left shoulder TTP, no obvious deformity.  Left upper extremity radial pulse 2+.  Timing bite marks noted, small less than 0.25 cm lip blood noted.  Patient possible seizure versus syncopal episode versus mechanical fall possible head injury, concern for left upper extremity shoulder fracture versus contusion.  We will get labs, CT head, x-ray, EKG, give meds and reassess.

## 2023-07-12 NOTE — ED ADULT NURSE NOTE - NSFALLUNIVINTERV_ED_ALL_ED
Bed/Stretcher in lowest position, wheels locked, appropriate side rails in place/Call bell, personal items and telephone in reach/Instruct patient to call for assistance before getting out of bed/chair/stretcher/Non-slip footwear applied when patient is off stretcher/[place] to call system/Physically safe environment - no spills, clutter or unnecessary equipment/Purposeful proactive rounding/Room/bathroom lighting operational, light cord in reach

## 2023-07-12 NOTE — H&P ADULT - NSHPPHYSICALEXAM_GEN_ALL_CORE
General:  Constitutional: Male, appears stated age, nontoxic, not in distress,    Head: NCAT   Eyes: clear sclera;   ENT: +small tongue laceration on R side, near front. No actively bleeding.   Extremities: No edema  Resp: breathing comfortably  Abdomen: NTND, no organomegaly   Skin: No cyanosis or pallor  MSK: Decreased L shoulder ROM, both active and passive, with pain on movement.    Neurological (>12):  MS: Awake, alert.  Oriented person place situation. Follows commands. Attends to examiner  Language: Speech is clear, fluent, good repetition, comprehension. Registers 2/3 words without prompting and 3/3 words with prompting. Took 3 attempts to spell "world" backwards.  CNs: PERRL (R 3mm, L 3mm). VFF. EOMI. No disconjugate gaze. V1-3 intact LT, No facial asymmetry b/l. Hearing grossly normal b/l. Tongue midline.     Motor - Normal bulk and tone throughout. No pronator drift. (Due to L shoulder pain, LUE movements were likely pain limited)    L/R (out of 5)       Deltoid  At least 3/5    Biceps   At least 3/5      Triceps  At least 3/5         Wrist Extension 5/5   Wrist Flexion  5/5      5/5   L/R (out of 5)       Hip Flexion  5/5    Hip Extension  5/5  Knee Extension  5/5  Dorsiflexion  5/5      Plantar Flexion 5/5     Sensation: Intact to LT b/l.   Cortical: No extinction on DSS   Reflexes L/R:  Biceps(C5) 2/2  BR(C6) 2/2   Triceps(C7)  1/1 Patellar(L4)   3/3    Ankle  3/3  Toes: mute  Coordination: No dysmetria to FTN in RUE. Unable to assess FTN on LUE due to pain. Normal HTS b/l  Gait: Normal Romberg. No postural instability. Normal stance.

## 2023-07-12 NOTE — ED PROVIDER NOTE - PROGRESS NOTE DETAILS
Received notification that patient has posterior shoulder dislocation on the left side otherwise admitted prior to my time in the ER discussed with neurology recommended reduction before going up to the EMU     Discussed with patient Ortho paged for consult but will attempt reduction in ER and consult Ortho as necessary otherwise attempted with 2 mg Versed and 50 mcg of fentanyl with successful reduction x-ray shows left shoulder and sling placed pt sent to EMU

## 2023-07-12 NOTE — ED ADULT NURSE NOTE - CHIEF COMPLAINT QUOTE
possible unwitnessed seizure activity x this AM. Found by family.   Pt noted w bottom lip wound, tongue wound and L shoulder pain.  unknown head strike.   on Keppra 1G daily. Hx absent sz.

## 2023-07-12 NOTE — H&P ADULT - ASSESSMENT
Pt is 58yoM pmhx R shoulder pain, CRISTAL, history of possible seizures (not EEG confirmed, pt takes standing Keppra 500 BID, follows Dr. Bryan), presents after possible seizure episode this morning, accompanied by L shoulder pain.    Impression: Episode of disorientation lasting 30 minutes, with evidence of tongue laceration, possible fall and lapse of memory. No motor or sensory abnormalities associated with episode. Episode could be seizure, similar in semiology to pt's prior episodes. Possible precipitating factors include alcohol on previous day and missed Keppra dose    Plan:  [ ] Admit to EMU  [ ] vEEG   [ ]Hold Keppra  [ ] Rescue Ativan and Briviact   [ ] Supportive care for L shoulder  [ ] On admission WBC 13.47, pt afebrile, CXR clear, no evidence of infection. Will monitor off abx and CTM CBC/CMP  [ ] CK level Pt is 58yoM pmhx R shoulder pain, CRISTAL, history of possible seizures (not EEG confirmed, pt takes standing Keppra 500 BID, follows Dr. Bryan), presents after possible seizure episode this morning, accompanied by L shoulder pain.    Impression: Episode of disorientation lasting 30 minutes, with evidence of tongue laceration, possible fall and lapse of memory. No motor or sensory abnormalities associated with episode. Episode could be seizure, similar in semiology to pt's prior episodes. Possible precipitating factors include alcohol on previous day and missed Keppra dose    Plan:  [ ] Admit to EMU  [ ] vEEG   [ ]Hold Keppra  [ ] Rescue Ativan and Briviact   [ ] Supportive care for L shoulder  [ ] On admission WBC 13.47, pt afebrile, CXR clear, no evidence of infection. Will monitor off abx and CTM CBC/CMP  [ ] UTox  [ ] CK level Pt is 58yoM pmhx R shoulder pain, CRISTAL, history of possible seizures (not EEG confirmed, pt takes standing Keppra 500 BID, follows Dr. Bryan), presents after possible seizure episode this morning, accompanied by L shoulder pain.    Impression: Episode of disorientation lasting 30 minutes, with evidence of tongue laceration, possible fall and lapse of memory. No motor or sensory abnormalities associated with episode. Episode could be seizure, similar in semiology to pt's prior episodes. Possible precipitating factors include alcohol on previous day and missed Keppra dose. In addition, repeat XR L Shoulder concerning for posterior dislocation (not present on initial XR L Shoulder, but due to continued clinical concern for dislocation from ED team, repeat XR order was obtained)    Plan:  [ ] Admit to EMU  [ ] vEEG   [ ]Hold Keppra  [ ] Rescue Ativan and Briviact   [ ] Repeat XR L Shoulder concerning for posterior dislocation Neurology discussed with ED - plan for reduction of dislocation while in ED, as well as orthopedics consult  [ ] On admission WBC 13.47, pt afebrile, CXR clear, no evidence of infection. Will monitor off abx and CTM CBC/CMP  [ ] UTox  [ ] CK level

## 2023-07-12 NOTE — ED PROVIDER NOTE - PHYSICAL EXAMINATION
GENERAL: well appearing in no acute distress, non-toxic appearing  HEAD: normocephalic, atraumatic  HEENT: oral mucosa moist, +Lip lac 0.25 cm, +tongue bite marks  CARDIAC: regular rate and rhythm, normal S1S2, no appreciable murmurs,   PULM: normal breath sounds, clear to ascultation bilaterally, no rales, rhonchi, wheezing  GI: Abd soft, nondistended, nontender  :  no suprapubic tenderness  NEURO: no focal motor or sensory deficits, CN2-12 intact, normal speech, PERRLA, EOMI, AAOx3  MSK: +Range of motion left upper extremity shoulder limited secondary to pain, + left shoulder TTP, no obvious deformity.  SKIN: well-perfused, extremities warm, no visible rashes

## 2023-07-12 NOTE — ED ADULT TRIAGE NOTE - GLASGOW COMA SCALE: EYE OPENING, MLM
-- DO NOT REPLY / DO NOT REPLY ALL --  -- Message is from the [facility]--      Patient is requesting a medication refill - medication is on active list    Was Medication Pended? Yes.    Rx Name and Dose:  METFORMIN 500MG, BLOOD GLUCOSE MONITORNG SUPPL W/DEVICE, BLOOD GLUCOSE TEST STRIP, ACCU-CHECK FASTCLIX LANCETS MISC    Duration: 90 days    Could not locate pharmacy in [facility].  Pharmacy name: [address] [address] Pharmacy phone: [facility] [phone] [facility] [address] [phone]    Caller Information       Type Contact Phone    01/23/2021 03:23 PM CST Phone (Incoming) LISA BULLOCK (Emergency Contact) [phone]          Alternative phone number: [phone] Is the patient phone number    Turnaround time given to caller:   \"This message will be sent to [state Provider's name]. The clinical team will fulfill your request as soon as they review your message when the office opens on Monday (or after the holiday).\"   (E4) spontaneous

## 2023-07-13 LAB
ALBUMIN SERPL ELPH-MCNC: 4.3 G/DL — SIGNIFICANT CHANGE UP (ref 3.3–5)
ALP SERPL-CCNC: 50 U/L — SIGNIFICANT CHANGE UP (ref 40–120)
ALT FLD-CCNC: 35 U/L — SIGNIFICANT CHANGE UP (ref 10–45)
ANION GAP SERPL CALC-SCNC: 12 MMOL/L — SIGNIFICANT CHANGE UP (ref 5–17)
AST SERPL-CCNC: 55 U/L — HIGH (ref 10–40)
BILIRUB SERPL-MCNC: 1 MG/DL — SIGNIFICANT CHANGE UP (ref 0.2–1.2)
BUN SERPL-MCNC: 16 MG/DL — SIGNIFICANT CHANGE UP (ref 7–23)
CALCIUM SERPL-MCNC: 8.9 MG/DL — SIGNIFICANT CHANGE UP (ref 8.4–10.5)
CHLORIDE SERPL-SCNC: 101 MMOL/L — SIGNIFICANT CHANGE UP (ref 96–108)
CO2 SERPL-SCNC: 24 MMOL/L — SIGNIFICANT CHANGE UP (ref 22–31)
CREAT SERPL-MCNC: 0.91 MG/DL — SIGNIFICANT CHANGE UP (ref 0.5–1.3)
EGFR: 96 ML/MIN/1.73M2 — SIGNIFICANT CHANGE UP
GLUCOSE SERPL-MCNC: 117 MG/DL — HIGH (ref 70–99)
HCT VFR BLD CALC: 47.3 % — SIGNIFICANT CHANGE UP (ref 39–50)
HGB BLD-MCNC: 15.5 G/DL — SIGNIFICANT CHANGE UP (ref 13–17)
MAGNESIUM SERPL-MCNC: 2.2 MG/DL — SIGNIFICANT CHANGE UP (ref 1.6–2.6)
MCHC RBC-ENTMCNC: 29.6 PG — SIGNIFICANT CHANGE UP (ref 27–34)
MCHC RBC-ENTMCNC: 32.8 GM/DL — SIGNIFICANT CHANGE UP (ref 32–36)
MCV RBC AUTO: 90.4 FL — SIGNIFICANT CHANGE UP (ref 80–100)
NRBC # BLD: 0 /100 WBCS — SIGNIFICANT CHANGE UP (ref 0–0)
PHOSPHATE SERPL-MCNC: 2.7 MG/DL — SIGNIFICANT CHANGE UP (ref 2.5–4.5)
PLATELET # BLD AUTO: 251 K/UL — SIGNIFICANT CHANGE UP (ref 150–400)
POTASSIUM SERPL-MCNC: 4.2 MMOL/L — SIGNIFICANT CHANGE UP (ref 3.5–5.3)
POTASSIUM SERPL-SCNC: 4.2 MMOL/L — SIGNIFICANT CHANGE UP (ref 3.5–5.3)
PROT SERPL-MCNC: 7.6 G/DL — SIGNIFICANT CHANGE UP (ref 6–8.3)
RBC # BLD: 5.23 M/UL — SIGNIFICANT CHANGE UP (ref 4.2–5.8)
RBC # FLD: 13.7 % — SIGNIFICANT CHANGE UP (ref 10.3–14.5)
SODIUM SERPL-SCNC: 137 MMOL/L — SIGNIFICANT CHANGE UP (ref 135–145)
WBC # BLD: 11.7 K/UL — HIGH (ref 3.8–10.5)
WBC # FLD AUTO: 11.7 K/UL — HIGH (ref 3.8–10.5)

## 2023-07-13 PROCEDURE — 99232 SBSQ HOSP IP/OBS MODERATE 35: CPT

## 2023-07-13 PROCEDURE — 95720 EEG PHY/QHP EA INCR W/VEEG: CPT

## 2023-07-13 RX ORDER — LACOSAMIDE 50 MG/1
1 TABLET ORAL
Qty: 30 | Refills: 1
Start: 2023-07-13 | End: 2023-09-10

## 2023-07-13 RX ORDER — LACOSAMIDE 50 MG/1
300 TABLET ORAL ONCE
Refills: 0 | Status: DISCONTINUED | OUTPATIENT
Start: 2023-07-13 | End: 2023-07-13

## 2023-07-13 RX ORDER — LACOSAMIDE 50 MG/1
1 TABLET ORAL
Qty: 30 | Refills: 0
Start: 2023-07-13

## 2023-07-13 RX ORDER — ACETAMINOPHEN 500 MG
650 TABLET ORAL EVERY 6 HOURS
Refills: 0 | Status: DISCONTINUED | OUTPATIENT
Start: 2023-07-13 | End: 2023-07-14

## 2023-07-13 RX ORDER — LACOSAMIDE 50 MG/1
200 TABLET ORAL
Refills: 0 | Status: DISCONTINUED | OUTPATIENT
Start: 2023-07-13 | End: 2023-07-14

## 2023-07-13 RX ADMIN — Medication 650 MILLIGRAM(S): at 11:23

## 2023-07-13 RX ADMIN — LACOSAMIDE 200 MILLIGRAM(S): 50 TABLET ORAL at 17:19

## 2023-07-13 RX ADMIN — LACOSAMIDE 300 MILLIGRAM(S): 50 TABLET ORAL at 09:50

## 2023-07-13 RX ADMIN — Medication 650 MILLIGRAM(S): at 11:53

## 2023-07-13 NOTE — EEG REPORT - NS EEG TEXT BOX
DAY 1 	START: 7/12/2023  7:03:04 PM     	END: 7/13/2023  08:00  	DURATION: 12 HR  39 MIN    DAILY EEG VISUAL ANALYSIS    The background was continuous, symmetric, spontaneously variable and reactive. During wakefulness, the posterior dominant rhythm consisted of symmetric, well-modulated 10 Hz activity, with amplitude to 30 uV, that attenuated to eye opening.  Low amplitude frontal beta was noted in wakefulness.   The anterior to posterior gradient was present.     BACKGROUND SLOWING:  No generalized background slowing was present.    FOCAL SLOWING:   -Intermittent polymorphic delta slowing in left temporal region    SLEEP BACKGROUND:  Drowsiness was characterized by fragmentation, attenuation, and slowing of the background activity.    Sleep was characterized by the presence of vertex waves, symmetric sleep spindles and K-complexes.    OTHER NON-EPILEPTIFORM FINDINGS:  None were present.    INTERICTAL EPILEPTIFORM ACTIVITY:   None were present.    EVENTS:  -7 electroclinical seizures recorded.   EEG onset precedes clinical onset by 1-2 seconds.  At EEG onset, there is a diffuse EEG attenuation, followed by diffuse rhythmic theta activity, which then evolves into higher voltage rhythmic theta activity over either the left or right temporal region, followed by rhythmic delta activity in this region prior to EEG offset.  4 seizures evolved over the left temporal region, 3 seizures evolved over the right temporal region.  Clinically, patient has an arousal from sleep, but no other significant clinical signs.    d1 21:43:45		SZ #1  d1 21:43:47		EEG ONSET - DIFFUSE ATTENUATION  d1 21:43:48		CLINICAL ONSET - INCREASED TEMPORAL MUSCLE ARTIFACT  d1 21:43:59		RHYTHMIC THETA LEFT TEMPORAL  d1 21:44:12		RHYTHMIC THETA, LEFT HEM, MAX LEFT TEMP  d1 21:44:24		RHYTHMIC DELTA, LEFT TEMP  d1 21:44:33		OFFSET    d1 22:48:27		SZ #2  d1 22:48:30		EEG ONSET - DIFFUSE ATTENUATION  d1 22:48:31		CLINICAL ONSET - MUSCLE ARTIFACT  			EVOLUTION LEFT TEMP  d1 22:49:17		OFFSET    d2 01:03:21		SZ #3  d2 01:03:23		EEG ONSET - DIFFUSE ATTENUATION  d2 01:03:25		CLINICAL ONSET - MUSCLE ARTIFACT  d2 01:03:36		DIFFUSE RHYTHMIC THETA  d2 01:03:41		EVOLUTION RIGHT TEMP  d2 01:04:14		OFFSET    d2 02:00:57		SZ#4  d2 02:00:59		EEG ONSET - DIFFUSE ATTENUATION  d2 02:01:00		CLINICAL ONSET  d2 02:01:13		DIFFUSE RHYTHMIC THETA  d2 02:01:20		EVOLUTION, RIGHT TEMP  d2 02:01:48		OFFSET    d2 02:58:42		SZ#5  d2 02:58:46		EEG ONSET - DIFFUSE ATTENUATION  d2 02:59:00		DIFFUSE RHYTHMIC THETA  d2 02:59:09		EVOLUTION RIGHT TEMP  d2 02:59:38		OFFSET    d2 03:57:26		SZ#6  d2 03:57:29		EEG ONSET - DIFFUSE ATTENUATION  d2 03:57:30		CLINICAL ONSET  d2 03:57:44		DIFFUSE RHYTHMIC THETA  d2 03:57:54		EVOLUTION LEFT TEMP  d2 03:58:17		OFFSET    d2 05:09:50		SZ#7  d2 05:09:52		EEG ONSET - DIFFUSE ATTENUATION  d2 05:09:56		CLINICAL ONSET - INCREASED TEMPORAL MUSCLE ARTIFACT  d2 05:10:07		MOVEMENT IN LEFT LEG  d2 05:10:09		DIFFUSE RHYTHMIC THETA  d2 05:10:21		EVOLUTION LEFT TEMP  d2 05:10:28		NONVERSIVE HEAD TURN TO RIGHT  d2 05:10:50		OFFSET    ARTIFACTS:  Intermittent myogenic and movement artifacts were noted.    ECG:  The heart rate on single channel ECG was predominantly between 60-80 BPM.    ASMs:   None    -------------------------------------------------------------------------------------------------------------------------------------------------------  EEG SUMMARY:  Abnormal EEG in the awake, drowsy and asleep states.  -7 electroclinical seizures recorded.   EEG onset precedes clinical onset by 1-2 seconds.  At EEG onset, there is a diffuse EEG attenuation, followed by diffuse rhythmic theta activity, which then evolves into higher voltage rhythmic theta activity over either the left or right temporal region, followed by rhythmic delta activity in this region prior to EEG offset.  4 seizures evolved over the left temporal region, 3 seizures evolved over the right temporal region.  Clinically, patient has an arousal from sleep, but no other significant clinical signs.  -Intermittent polymorphic delta slowing in left temporal region    -------------------------------------------------------------------------------------------------------------------------------------------------------  IMPRESSION/CLINICAL CORRELATE:  This is an abnormal EEG record.   1.	Seven electroclinical seizures recorded, as described above.  2.	Focal dysfunction in the left temporal region.    Peterson Wood MD  Neurology Attending Physician

## 2023-07-14 ENCOUNTER — TRANSCRIPTION ENCOUNTER (OUTPATIENT)
Age: 58
End: 2023-07-14

## 2023-07-14 VITALS
OXYGEN SATURATION: 96 % | HEART RATE: 101 BPM | RESPIRATION RATE: 18 BRPM | TEMPERATURE: 99 F | DIASTOLIC BLOOD PRESSURE: 77 MMHG | SYSTOLIC BLOOD PRESSURE: 118 MMHG

## 2023-07-14 LAB
ALBUMIN SERPL ELPH-MCNC: 4 G/DL — SIGNIFICANT CHANGE UP (ref 3.3–5)
ALP SERPL-CCNC: 46 U/L — SIGNIFICANT CHANGE UP (ref 40–120)
ALT FLD-CCNC: 34 U/L — SIGNIFICANT CHANGE UP (ref 10–45)
ANION GAP SERPL CALC-SCNC: 12 MMOL/L — SIGNIFICANT CHANGE UP (ref 5–17)
AST SERPL-CCNC: 41 U/L — HIGH (ref 10–40)
BILIRUB SERPL-MCNC: 1 MG/DL — SIGNIFICANT CHANGE UP (ref 0.2–1.2)
BUN SERPL-MCNC: 16 MG/DL — SIGNIFICANT CHANGE UP (ref 7–23)
CALCIUM SERPL-MCNC: 8.9 MG/DL — SIGNIFICANT CHANGE UP (ref 8.4–10.5)
CHLORIDE SERPL-SCNC: 105 MMOL/L — SIGNIFICANT CHANGE UP (ref 96–108)
CO2 SERPL-SCNC: 22 MMOL/L — SIGNIFICANT CHANGE UP (ref 22–31)
CREAT SERPL-MCNC: 0.96 MG/DL — SIGNIFICANT CHANGE UP (ref 0.5–1.3)
EGFR: 90 ML/MIN/1.73M2 — SIGNIFICANT CHANGE UP
GLUCOSE SERPL-MCNC: 106 MG/DL — HIGH (ref 70–99)
HCT VFR BLD CALC: 48.5 % — SIGNIFICANT CHANGE UP (ref 39–50)
HGB BLD-MCNC: 15.8 G/DL — SIGNIFICANT CHANGE UP (ref 13–17)
LEVETIRACETAM SERPL-MCNC: 5.4 UG/ML — LOW (ref 10–40)
MAGNESIUM SERPL-MCNC: 2.3 MG/DL — SIGNIFICANT CHANGE UP (ref 1.6–2.6)
MCHC RBC-ENTMCNC: 29.5 PG — SIGNIFICANT CHANGE UP (ref 27–34)
MCHC RBC-ENTMCNC: 32.6 GM/DL — SIGNIFICANT CHANGE UP (ref 32–36)
MCV RBC AUTO: 90.7 FL — SIGNIFICANT CHANGE UP (ref 80–100)
NRBC # BLD: 0 /100 WBCS — SIGNIFICANT CHANGE UP (ref 0–0)
PHOSPHATE SERPL-MCNC: 2.5 MG/DL — SIGNIFICANT CHANGE UP (ref 2.5–4.5)
PLATELET # BLD AUTO: 255 K/UL — SIGNIFICANT CHANGE UP (ref 150–400)
POTASSIUM SERPL-MCNC: 4.3 MMOL/L — SIGNIFICANT CHANGE UP (ref 3.5–5.3)
POTASSIUM SERPL-SCNC: 4.3 MMOL/L — SIGNIFICANT CHANGE UP (ref 3.5–5.3)
PROT SERPL-MCNC: 7.3 G/DL — SIGNIFICANT CHANGE UP (ref 6–8.3)
RBC # BLD: 5.35 M/UL — SIGNIFICANT CHANGE UP (ref 4.2–5.8)
RBC # FLD: 13.7 % — SIGNIFICANT CHANGE UP (ref 10.3–14.5)
SODIUM SERPL-SCNC: 139 MMOL/L — SIGNIFICANT CHANGE UP (ref 135–145)
WBC # BLD: 10.24 K/UL — SIGNIFICANT CHANGE UP (ref 3.8–10.5)
WBC # FLD AUTO: 10.24 K/UL — SIGNIFICANT CHANGE UP (ref 3.8–10.5)

## 2023-07-14 PROCEDURE — 80177 DRUG SCRN QUAN LEVETIRACETAM: CPT

## 2023-07-14 PROCEDURE — 36415 COLL VENOUS BLD VENIPUNCTURE: CPT

## 2023-07-14 PROCEDURE — 71045 X-RAY EXAM CHEST 1 VIEW: CPT

## 2023-07-14 PROCEDURE — 82435 ASSAY OF BLOOD CHLORIDE: CPT

## 2023-07-14 PROCEDURE — 85018 HEMOGLOBIN: CPT

## 2023-07-14 PROCEDURE — 96376 TX/PRO/DX INJ SAME DRUG ADON: CPT

## 2023-07-14 PROCEDURE — 84295 ASSAY OF SERUM SODIUM: CPT

## 2023-07-14 PROCEDURE — 96366 THER/PROPH/DIAG IV INF ADDON: CPT

## 2023-07-14 PROCEDURE — 85025 COMPLETE CBC W/AUTO DIFF WBC: CPT

## 2023-07-14 PROCEDURE — 73030 X-RAY EXAM OF SHOULDER: CPT

## 2023-07-14 PROCEDURE — 84100 ASSAY OF PHOSPHORUS: CPT

## 2023-07-14 PROCEDURE — 81001 URINALYSIS AUTO W/SCOPE: CPT

## 2023-07-14 PROCEDURE — 80053 COMPREHEN METABOLIC PANEL: CPT

## 2023-07-14 PROCEDURE — 80307 DRUG TEST PRSMV CHEM ANLYZR: CPT

## 2023-07-14 PROCEDURE — 83605 ASSAY OF LACTIC ACID: CPT

## 2023-07-14 PROCEDURE — 82962 GLUCOSE BLOOD TEST: CPT

## 2023-07-14 PROCEDURE — 82330 ASSAY OF CALCIUM: CPT

## 2023-07-14 PROCEDURE — 84132 ASSAY OF SERUM POTASSIUM: CPT

## 2023-07-14 PROCEDURE — 93005 ELECTROCARDIOGRAM TRACING: CPT

## 2023-07-14 PROCEDURE — 82947 ASSAY GLUCOSE BLOOD QUANT: CPT

## 2023-07-14 PROCEDURE — C9254: CPT

## 2023-07-14 PROCEDURE — 96375 TX/PRO/DX INJ NEW DRUG ADDON: CPT

## 2023-07-14 PROCEDURE — 84484 ASSAY OF TROPONIN QUANT: CPT

## 2023-07-14 PROCEDURE — 84146 ASSAY OF PROLACTIN: CPT

## 2023-07-14 PROCEDURE — 83735 ASSAY OF MAGNESIUM: CPT

## 2023-07-14 PROCEDURE — 99232 SBSQ HOSP IP/OBS MODERATE 35: CPT

## 2023-07-14 PROCEDURE — 85027 COMPLETE CBC AUTOMATED: CPT

## 2023-07-14 PROCEDURE — 95716 VEEG EA 12-26HR CONT MNTR: CPT

## 2023-07-14 PROCEDURE — 95700 EEG CONT REC W/VID EEG TECH: CPT

## 2023-07-14 PROCEDURE — 93010 ELECTROCARDIOGRAM REPORT: CPT

## 2023-07-14 PROCEDURE — 99285 EMERGENCY DEPT VISIT HI MDM: CPT

## 2023-07-14 PROCEDURE — 85014 HEMATOCRIT: CPT

## 2023-07-14 PROCEDURE — 70450 CT HEAD/BRAIN W/O DYE: CPT | Mod: MA

## 2023-07-14 PROCEDURE — 82550 ASSAY OF CK (CPK): CPT

## 2023-07-14 PROCEDURE — 96365 THER/PROPH/DIAG IV INF INIT: CPT

## 2023-07-14 PROCEDURE — 82803 BLOOD GASES ANY COMBINATION: CPT

## 2023-07-14 PROCEDURE — 73030 X-RAY EXAM OF SHOULDER: CPT | Mod: 26,LT

## 2023-07-14 RX ORDER — IMMUNE GLOBULIN (HUMAN) 10 G/100ML
50 INJECTION INTRAVENOUS; SUBCUTANEOUS ONCE
Refills: 0 | Status: DISCONTINUED | OUTPATIENT
Start: 2023-07-14 | End: 2023-07-14

## 2023-07-14 RX ORDER — LACOSAMIDE 50 MG/1
1 TABLET ORAL
Qty: 60 | Refills: 0
Start: 2023-07-14

## 2023-07-14 RX ORDER — LEVETIRACETAM 250 MG/1
1 TABLET, FILM COATED ORAL
Refills: 0 | DISCHARGE

## 2023-07-14 RX ADMIN — Medication 650 MILLIGRAM(S): at 09:36

## 2023-07-14 RX ADMIN — Medication 650 MILLIGRAM(S): at 08:26

## 2023-07-14 RX ADMIN — LACOSAMIDE 200 MILLIGRAM(S): 50 TABLET ORAL at 05:54

## 2023-07-14 RX ADMIN — ENOXAPARIN SODIUM 40 MILLIGRAM(S): 100 INJECTION SUBCUTANEOUS at 05:54

## 2023-07-14 NOTE — DISCHARGE NOTE PROVIDER - NSDCMRMEDTOKEN_GEN_ALL_CORE_FT
Keppra 500 mg oral tablet: 1 orally 2 times a day  lacosamide 200 mg oral tablet: 1 tab(s) orally 2 times a day MDD: 400   lacosamide 200 mg oral tablet: 1 tab(s) orally 2 times a day MDD: 400mg

## 2023-07-14 NOTE — DISCHARGE NOTE PROVIDER - HOSPITAL COURSE
HPI: Pt is 58yoM pmhx R shoulder pain, CRISTAL, history of possible seizures (not EEG confirmed, pt takes standing Keppra 500 BID, follows Dr. Bryan), presents after possible seizure episode this morning, accompanied by L shoulder pain.    Per pt, this AM he woke up with L shoulder pain (9/10, exacerbated by movement, nonradiating), took shower, and went downstairs to get ready for work day. During this time pt felt disoriented for 30 minutes, during which he was unsure where he was supposed to go for work, but no lapses in memory this AM. Daughter heard loud noise, and found dad sitting downstairs clutching his L shoulder. Pt unsure if he took his Keppra the night before admission, but confirms that he took it that morning. He reports good adherence to Keppra generally. Had two drinks of beer on night prior to admission. Pt does not report any headstrike. Pt's usual "seizure" episodes involves disorientation regarding work and tasks for the day with lapses in memory, but pt retains awareness of self and family. He has had 8 of these episodes in the past 5 years - no confirmed EEG evidence that these episodes were seizures. MRI Brain in 11/2022 showing nonspecific T2 FLAIR hyperintensities in Right Frontal Lobe, with no abnormal mesial temporal findings. Pt's last episode was January 2023 - according to wife, there was a hole in wall and cut in pt's chin, and pt did not remember how the hole/cut got there. Pt also felt disoriented during this episode. Of note, as a child (<3 years old), pt was informed by his mother that he had few episodes of his eyes rolling back and him becoming unresponsive, which resolved after cold water splashed on head. This only happened a few times in childhood and never recurred in adolescence or adulthood. No family history of seizures. Pt denies fever, chills, weakness, numbness/tingling, headache, tremors, chest pain, dyspnea, palpitations, n/v/c/d, or urinary symptoms.    Hospital Course:   7/12: Pt. comes to ED after seizure episode with posterior dislocation of L shoulder, shoulder is successfully reduced in ED confirmed by x-ray before and after reduction.   7/13: Pt. was admitted to EMU for monitoring. 7 electroclinical seizures recorded.   EEG onset precedes clinical onset by 1-2 seconds.  At EEG onset, there is a diffuse EEG attenuation, followed by diffuse rhythmic theta activity, which then evolves into higher voltage rhythmic theta activity over either the left or right temporal region, followed by rhythmic delta activity in this region prior to EEG offset.  4 seizures evolved over the left temporal region, 3 seizures evolved over the right temporal region.  Clinically, patient has an arousal from sleep, but no other significant clinical signs. Pt. given education on importance of compliance to AED, pt. demonstrated understanding of the importance of medication compliance and risks of not taking medication as prescribed.   7/14: No seizures captured on EEG, however EEG is abnormal and shows increased risk for focal seizures with onset from the left and right frontotemporal regions and focal dysfunction in the left temporal region. HPI: Pt is 58yoM pmhx R shoulder pain, CRISTAL, history of possible seizures (not EEG confirmed, pt takes standing Keppra 500 BID, follows Dr. Bryan), presents after possible seizure episode this morning, accompanied by L shoulder pain. Per pt the morning of admission he woke up with L shoulder pain (9/10, exacerbated by movement, nonradiating), took shower, and went downstairs to get ready for work day. During this time pt felt disoriented for 30 minutes, during which he was unsure where he was supposed to go for work, but no lapses in memory this AM. Daughter heard loud noise, and found dad sitting downstairs clutching his L shoulder. Pt unsure if he took his Keppra the night before admission, but confirms that he took it that morning. He reports good adherence to Keppra generally. Had two drinks of beer on night prior to admission. Pt does not report any headstrike. Pt's usual "seizure" episodes involves disorientation regarding work and tasks for the day with lapses in memory, but pt retains awareness of self and family. He has had 8 of these episodes in the past 5 years - no confirmed EEG evidence that these episodes were seizures. MRI Brain in 11/2022 showing nonspecific T2 FLAIR hyperintensities in Right Frontal Lobe, with no abnormal mesial temporal findings. Pt's last episode was January 2023 - according to wife, there was a hole in wall and cut in pt's chin, and pt did not remember how the hole/cut got there. Pt also felt disoriented during this episode. Of note, as a child (<3 years old), pt was informed by his mother that he had few episodes of his eyes rolling back and him becoming unresponsive, which resolved after cold water splashed on head. This only happened a few times in childhood and never recurred in adolescence or adulthood. No family history of seizures. Pt denies fever, chills, weakness, numbness/tingling, headache, tremors, chest pain, dyspnea, palpitations, n/v/c/d, or urinary symptoms.    Hospital Course:   7/12: Pt. comes to ED after seizure episode with posterior dislocation of L shoulder, shoulder is successfully reduced in ED confirmed by x-ray before and after reduction.   7/13: Pt. was admitted to EMU for monitoring. 7 electroclinical seizures recorded.  EEG onset precedes clinical onset by 1-2 seconds.  At EEG onset, there is a diffuse EEG attenuation, followed by diffuse rhythmic theta activity, which then evolves into higher voltage rhythmic theta activity over either the left or right temporal region, followed by rhythmic delta activity in this region prior to EEG offset.  4 seizures evolved over the left temporal region, 3 seizures evolved over the right temporal region.  Clinically, patient has an arousal from sleep, but no other significant clinical signs. Pt. given education on importance of compliance to AED, pt. demonstrated understanding of the importance of medication compliance and risks of not taking medication as prescribed.   7/14: No seizures captured on EEG, however EEG is abnormal and shows increased risk for focal seizures with onset from the left and right frontotemporal regions and focal dysfunction in the left temporal region.

## 2023-07-14 NOTE — DISCHARGE NOTE PROVIDER - NSDCFUSCHEDAPPT_GEN_ALL_CORE_FT
[facility]  NEUROLOGY [address] [place] [address]  Scheduled Appointment: 07/31/2023

## 2023-07-14 NOTE — DISCHARGE NOTE PROVIDER - CARE PROVIDER_API CALL
[facility]  Neurology  [address] [facility], [facility] [address]  [place] [address]  Phone: [phone]  Fax: [phone]  Follow Up Time: 1 month

## 2023-07-14 NOTE — PROGRESS NOTE ADULT - ASSESSMENT
Pt is 58yoM pmhx R shoulder pain, CRISTAL, history of possible seizures (not EEG confirmed, pt takes standing Keppra 500 BID, follows Dr. Bryan), presents after possible seizure episode this morning, accompanied by L shoulder pain.    Impression: Episode of disorientation lasting 30 minutes, with evidence of tongue laceration, possible fall and lapse of memory. No motor or sensory abnormalities associated with episode. Episode could be seizure, similar in semiology to pt's prior episodes. Possible precipitating factors include alcohol on previous day and missed Keppra dose. In addition, repeat XR L Shoulder concerning for posterior dislocation (not present on initial XR L Shoulder, but due to continued clinical concern for dislocation from ED team, repeat XR order was obtained)     Pt. to receive axillary L shoulder films today to confirm successful reduction of posterior dislocation.     Plan:  [] Admit to EMU  [] vEEG   []Hold Keppra  [] Rescue Ativan and Briviact   [] Repeat XR L Shoulder concerning for posterior dislocation Neurology discussed with ED - plan for reduction of dislocation while in ED, as well as orthopedics consult  [] On admission WBC 13.47, pt afebrile, CXR clear, no evidence of infection. Will monitor off abx and CTM CBC/CMP  [] UTox  [] CK level

## 2023-07-14 NOTE — PROGRESS NOTE ADULT - ATTENDING COMMENTS
C/O lower abdominal pain and intermittent shortness of breath X 2 days. Has fentanyl patch for back pain. plan as above

## 2023-07-14 NOTE — EEG REPORT - NS EEG TEXT BOX
DAY 2 	START: 7/13/2023    	END: 7/14/2023    	DURATION: 22 HR  41 MIN    DAILY EEG VISUAL ANALYSIS    The background was continuous, symmetric, spontaneously variable and reactive. During wakefulness, the posterior dominant rhythm consisted of symmetric, well-modulated 10 Hz activity, with amplitude to 30 uV, that attenuated to eye opening.  Low amplitude frontal beta was noted in wakefulness.   The anterior to posterior gradient was present.     BACKGROUND SLOWING:  No generalized background slowing was present.    FOCAL SLOWING:   -Intermittent polymorphic delta slowing in left temporal region    SLEEP BACKGROUND:  Drowsiness was characterized by fragmentation, attenuation, and slowing of the background activity.    Sleep was characterized by the presence of vertex waves, symmetric sleep spindles and K-complexes.    OTHER NON-EPILEPTIFORM FINDINGS:  None were present.    INTERICTAL EPILEPTIFORM ACTIVITY:   -Rare sharp waves in the left frontal region, maximum Fp1/F7/F3  -Rare sharp waves in the right frontal region, maximum Fp2/F8/F4    EVENTS:  None    ARTIFACTS:  Intermittent myogenic and movement artifacts were noted.    ECG:  The heart rate on single channel ECG was predominantly between 60-80 BPM.      ASMs:   None      -------------------------------------------------------------------------------------------------------------------------------------------------------  EEG SUMMARY:  Abnormal EEG in the awake, drowsy and asleep states.  -Rare sharp waves in the left frontal region, maximum Fp1/F7/F3  -Rare sharp waves in the right frontal region, maximum Fp2/F8/F4  -Intermittent polymorphic delta slowing in left temporal region        -------------------------------------------------------------------------------------------------------------------------------------------------------  IMPRESSION/CLINICAL CORRELATE:  This is an abnormal EEG record.   1.	Increased risk for focal seizures with onset from the left and right frontotemporal regions.  2.	Focal dysfunction in the left temporal region.  3.         No seizures recorded.    Peterson Wood MD  Neurology Attending Physician       DAY 2 	START: 7/13/2023    	END: 7/14/2023    	DURATION: 22 HR  41 MIN    DAILY EEG VISUAL ANALYSIS    The background was continuous, symmetric, spontaneously variable and reactive. During wakefulness, the posterior dominant rhythm consisted of symmetric, well-modulated 10 Hz activity, with amplitude to 30 uV, that attenuated to eye opening.  Low amplitude frontal beta was noted in wakefulness.   The anterior to posterior gradient was present.     BACKGROUND SLOWING:  No generalized background slowing was present.    FOCAL SLOWING:   -Intermittent polymorphic delta slowing in left temporal region    SLEEP BACKGROUND:  Drowsiness was characterized by fragmentation, attenuation, and slowing of the background activity.    Sleep was characterized by the presence of vertex waves, symmetric sleep spindles and K-complexes.    OTHER NON-EPILEPTIFORM FINDINGS:  None were present.    INTERICTAL EPILEPTIFORM ACTIVITY:   -Rare sharp waves in the left frontal region, maximum Fp1/F7/F3  -Rare sharp waves in the right frontal region, maximum Fp2/F8/F4    EVENTS:  None    ARTIFACTS:  Intermittent myogenic and movement artifacts were noted.    ECG:  The heart rate on single channel ECG was predominantly between 60-80 BPM.      ASMs:   LCM 200mg BID      -------------------------------------------------------------------------------------------------------------------------------------------------------  EEG SUMMARY:  Abnormal EEG in the awake, drowsy and asleep states.  -Rare sharp waves in the left frontal region, maximum Fp1/F7/F3  -Rare sharp waves in the right frontal region, maximum Fp2/F8/F4  -Intermittent polymorphic delta slowing in left temporal region        -------------------------------------------------------------------------------------------------------------------------------------------------------  IMPRESSION/CLINICAL CORRELATE:  This is an abnormal EEG record.   1.	Increased risk for focal seizures with onset from the left and right frontotemporal regions.  2.	Focal dysfunction in the left temporal region.  3.         No seizures recorded.    Peterson Wood MD  Neurology Attending Physician       DAY 2 	START: 7/13/2023  0800AM  	END: 7/14/2023  1026 AM  	DURATION: 22 HR  25 MIN    DAILY EEG VISUAL ANALYSIS    The background was continuous, symmetric, spontaneously variable and reactive. During wakefulness, the posterior dominant rhythm consisted of symmetric, well-modulated 10 Hz activity, with amplitude to 30 uV, that attenuated to eye opening.  Low amplitude frontal beta was noted in wakefulness.   The anterior to posterior gradient was present.     BACKGROUND SLOWING:  No generalized background slowing was present.    FOCAL SLOWING:   -Intermittent polymorphic delta slowing in left temporal region    SLEEP BACKGROUND:  Drowsiness was characterized by fragmentation, attenuation, and slowing of the background activity.    Sleep was characterized by the presence of vertex waves, symmetric sleep spindles and K-complexes.    OTHER NON-EPILEPTIFORM FINDINGS:  None were present.    INTERICTAL EPILEPTIFORM ACTIVITY:   -Rare sharp waves in the left frontal region, maximum Fp1/F7/F3  -Rare sharp waves in the right frontal region, maximum Fp2/F8/F4    EVENTS:  None    ARTIFACTS:  Intermittent myogenic and movement artifacts were noted.    ECG:  The heart rate on single channel ECG was predominantly between 60-80 BPM.      ASMs:   LCM 200mg BID      -------------------------------------------------------------------------------------------------------------------------------------------------------  EEG SUMMARY:  Abnormal EEG in the awake, drowsy and asleep states.  -Rare sharp waves in the left frontal region, maximum Fp1/F7/F3  -Rare sharp waves in the right frontal region, maximum Fp2/F8/F4  -Intermittent polymorphic delta slowing in left temporal region        -------------------------------------------------------------------------------------------------------------------------------------------------------  IMPRESSION/CLINICAL CORRELATE:  This is an abnormal EEG record.   1.	Increased risk for focal seizures with onset from the left and right frontotemporal regions.  2.	Focal dysfunction in the left temporal region.  3.         No seizures recorded.    Peterson Wood MD  Neurology Attending Physician

## 2023-07-14 NOTE — DISCHARGE NOTE NURSING/CASE MANAGEMENT/SOCIAL WORK - NSDPDISTO_GEN_ALL_CORE
Home It looks like they want the prescription changed to mail order pharmacy. Could you please check with patient if that is ok and he has enough meds until these would reach him?

## 2023-07-14 NOTE — DISCHARGE NOTE PROVIDER - NSDCCPCAREPLAN_GEN_ALL_CORE_FT
PRINCIPAL DISCHARGE DIAGNOSIS  Diagnosis: Seizure  Assessment and Plan of Treatment: Please take Vimpat 200mg twice a day as prescribed

## 2023-07-14 NOTE — PROGRESS NOTE ADULT - SUBJECTIVE AND OBJECTIVE BOX
Interval History: No acute events overnight. EEG from overnight shows no more seizures. Pt. reports no discomfort since reduction of left shoulder.     HPI:  Pt is 58yoM pmhx R shoulder pain, CRISTAL, history of possible seizures (not EEG confirmed, pt takes standing Keppra 500 BID, follows Dr. Bryan), presents after possible seizure episode this morning, accompanied by L shoulder pain.    Per pt, this AM he woke up with L shoulder pain (9/10, exacerbated by movement, nonradiating), took shower, and went downstairs to get ready for work day. During this time pt felt disoriented for 30 minutes, during which he was unsure where he was supposed to go for work, but no lapses in memory this AM. Daughter heard loud noise, and found dad sitting downstairs clutching his L shoulder. Pt unsure if he took his Keppra the night before admission, but confirms that he took it that morning. He reports good adherence to Keppra generally. Had two drinks of beer on night prior to admission. Pt does not report any headstrike. Pt's usual "seizure" episodes involves disorientation regarding work and tasks for the day with lapses in memory, but pt retains awareness of self and family. He has had 8 of these episodes in the past 5 years - no confirmed EEG evidence that these episodes were seizures. MRI Brain in 11/2022 showing nonspecific T2 FLAIR hyperintensities in Right Frontal Lobe, with no abnormal mesial temporal findings. Pt's last episode was January 2023 - according to wife, there was a hole in wall and cut in pt's chin, and pt did not remember how the hole/cut got there. Pt also felt disoriented during this episode. Of note, as a child (<3 years old), pt was informed by his mother that he had few episodes of his eyes rolling back and him becoming unresponsive, which resolved after cold water splashed on head. This only happened a few times in childhood and never recurred in adolescence or adulthood. No family history of seizures. Pt denies fever, chills, weakness, numbness/tingling, headache, tremors, chest pain, dyspnea, palpitations, n/v/c/d, or urinary symptoms.    Vitals:   T(C): 37.2 (07-14-23 @ 08:54), Max: 37.2 (07-13-23 @ 13:08)  HR: 100 (07-14-23 @ 08:54) (77 - 118)  BP: 110/70 (07-14-23 @ 08:54) (110/70 - 127/88)  RR: 18 (07-14-23 @ 08:54) (18 - 18)  SpO2: 93% (07-14-23 @ 08:54) (92% - 95%)    Physical Exam: General:  Constitutional: Male, appears stated age, nontoxic, not in distress,    Head: NCAT   Eyes: clear sclera;   ENT: +small tongue laceration on R side, near front. No actively bleeding.   Extremities: No edema  Resp: breathing comfortably  Abdomen: NTND, no organomegaly   Skin: No cyanosis or pallor  MSK: Decreased L shoulder ROM, both active and passive, with pain on movement.    Neurological (>12):  MS: Awake, alert.  Oriented person place situation. Follows commands. Attends to examiner  Language: Speech is clear, fluent, good repetition, comprehension. Registers 2/3 words without prompting and 3/3 words with prompting. Took 3 attempts to spell "world" backwards.  CNs: PERRL (R 3mm, L 3mm). VFF. EOMI. No disconjugate gaze. V1-3 intact LT, No facial asymmetry b/l. Hearing grossly normal b/l. Tongue midline.     Motor - Normal bulk and tone throughout. No pronator drift. (Due to L shoulder pain, LUE movements were likely pain limited)    L/R (out of 5)       Deltoid  At least 3/5    Biceps   At least 3/5      Triceps  At least 3/5         Wrist Extension 5/5   Wrist Flexion  5/5      5/5   L/R (out of 5)       Hip Flexion  5/5    Hip Extension  5/5  Knee Extension  5/5  Dorsiflexion  5/5      Plantar Flexion 5/5     Sensation: Intact to LT b/l.   Cortical: No extinction on DSS   Reflexes L/R:  Biceps(C5) 2/2  BR(C6) 2/2   Triceps(C7)  1/1 Patellar(L4)   3/3    Ankle  3/3  Toes: mute  Coordination: No dysmetria to FTN in RUE. Unable to assess FTN on LUE due to pain. Normal HTS b/l  Gait: Normal Romberg. No postural instability. Normal stance.    Labs                        15.8   10.24 )-----------( 255      ( 14 Jul 2023 06:33 )             48.5     07-14    139  |  105  |  16  ----------------------------<  106<H>  4.3   |  22  |  0.96    Ca    8.9      14 Jul 2023 06:33  Phos  2.5     07-14  Mg     2.3     07-14    TPro  7.3  /  Alb  4.0  /  TBili  1.0  /  DBili  x   /  AST  41<H>  /  ALT  34  /  AlkPhos  46  07-14      < from: Xray Shoulder 2 Views, Left (07.14.23 @ 11:07) >    ACC: [id number] EXAM:  XR SHOULDER COMP MIN 2V LT   ORDERED BY:  TRICIA MCKOY     PROCEDURE DATE:  07/14/2023      Radiology:     INTERPRETATION:  Clinical indication: Posterior shoulder dislocation.    3 views of the left shoulder were performed.    Comparison is made to July 12, 2023.    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 There is no dislocation on the   current examination. There is mild acromioclavicular joint arthrosis.   Findings are grossly unchanged.    < end of copied text >

## 2023-07-14 NOTE — DISCHARGE NOTE NURSING/CASE MANAGEMENT/SOCIAL WORK - PATIENT PORTAL LINK FT
You can access the FollowMyHealth Patient Portal offered by [facility] by registering at the following website: [email or web address]. By joining "[employer]"’s FollowMyHealth portal, you will also be able to view your health information using other applications (apps) compatible with our system.

## 2023-07-20 ENCOUNTER — NON-APPOINTMENT (OUTPATIENT)
Age: 58
End: 2023-07-20

## 2023-07-31 ENCOUNTER — APPOINTMENT (OUTPATIENT)
Dept: NEUROLOGY | Facility: CLINIC | Age: 58
End: 2023-07-31
Payer: COMMERCIAL

## 2023-07-31 VITALS
SYSTOLIC BLOOD PRESSURE: 112 MMHG | HEART RATE: 101 BPM | BODY MASS INDEX: 30.46 KG/M2 | WEIGHT: 245 LBS | DIASTOLIC BLOOD PRESSURE: 72 MMHG | HEIGHT: 75 IN

## 2023-07-31 PROCEDURE — 93000 ELECTROCARDIOGRAM COMPLETE: CPT | Mod: 1L

## 2023-07-31 PROCEDURE — 99213 OFFICE O/P EST LOW 20 MIN: CPT

## 2023-07-31 NOTE — DISCUSSION/SUMMARY
[Complex Partial] : complex partial [Idiopathic] : idiopathic  [Focal] : focal [Risks Associated with Driving/[place] Law] : As per my usual protocol, the patient was advised in regards to risks and driving privileges associated with the [place] Guidelines.  [Safety Recommendations] : The patient was advised in regards to the risk of seizures and general seizure safety recommendations including not to be bathing alone, climbing to high places and operating heavy machinery. [Compliance with Medications] : The importance of compliance with medications was reinforced. [Medication Side Effects] : High frequency and serious potential medication adverse effects were reviewed with the patient, including but not exclusive to psychiatric effects.  Information sheets on medication side effects were made available to the patient in our clinic.  The patient or advocate agrees to notify us for any concerns. [Sleep Hygiene/Sleep Disruption Risks] : Sleep hygiene and the risks of sleep disruption were discussed. [Risk of Death] : Risk of death associated with seizures / SUDEP was discussed. [Obtain Copies of Medical Records] : Patient was asked to obtain copies of pertinent prior medical records or studies [FreeTextEntry1] : Recent episodes c/w focal epilepsy. Recurrent confusional episodes, amnesia. Concern for focal epilepsy.  Possible childhood seizures. Compliance concerns.  MRI/AEEG reportedly normal. 12/2020 EEG ? single T3 spike. CRISTAL per sleep study Shoulder dislocation  LUE  on LAC 200mg bid rec f/u ECG consider LEV adjunct Other options ie LTG. PER -sleep med f/u, hygiene -p event HA.  rec naproxen   prn -seizure precautions, recommendation is not to drive -sz risks included death, accidental injury discussed with pt/salvador - f/u AEEG for eval of sz control -driving proscribed.  x time 21min RTC 3 and 6mo

## 2023-07-31 NOTE — HISTORY OF PRESENT ILLNESS
[FreeTextEntry1] : Rx:   LAC 200mg bid now  Updates:   no ADRs on LEV low dose. Had seizure found on base of stairs, dislocated left shoulder, concussion.  To ER [facility], 7 szs overnight on EEG 4 left, 5 right, bitemporal IEDs, L  slowing on EEG.  Had not been taking prior.  Changed to LAC per EMU MD preference. no ADRs  10/2022  Multiple episodes of seizures - ERUM humming, nonresponsive, drooling.  In ER at [facility] p vacation/flight.  Admitted issue with compliance reducing dose intermittently on own accord.   3 other milder events per salvador in this past year  ROS:  Sleep study c/w CRISTAL.  Difficulty sleeping. Some chronic sleep issues. More forgetful  for names.  remote memory is good.  PMHX:  58 RH M initially seen with wife, Lenora. 6/2019 episode of amnesia in [place] x 5hrs, resolved spont.  Repeated question asking. x2 more times that year. 1 Mo later, episode of standing, not speaking, confused/disoriented.  Ex:  In confused state, after getting out of train, possibly after arousal. Went to wrong/old job location, unexplained why.    Saw Neurologist Dr Barcenas then Dr Gilliam - EEG done x 24hrs - told unrevealing.   Started on LEV empirically to 500mg bid.   4-5 more incidents in last year subsequent to med adjustments, from sleep, confused state, speaking, disoriented, amnestic x few minutes.  Staring, lip smacking described at least once.  HA afterwards. Most recent 9/7/21, missed Rx. Also, missed Rx with event in 12/2020. Reports poor compliance with Rx.  No ADR.  Occ HA.    SocHx: Works as . Was driving. Denies emotional trauma.  No TOB.  Occ ETOH use socially. No drugs.  PMHx: Events as child with possible staring spells/stiffening. no TBI, concussions, CNS infections. no FMHx szs.  FmHx:  Ma dc CAD, Fa dc colon ca brothers - COPD, CAD

## 2023-07-31 NOTE — CONSULT LETTER
[Dear  ___] : Dear  [unfilled], [Consult Letter:] : I had the pleasure of evaluating your patient, [unfilled]. [( Thank you for referring [unfilled] for consultation for _____ )] : Thank you for referring [unfilled] for consultation for [unfilled] [Please see my note below.] : Please see my note below. [Consult Closing:] : Thank you very much for allowing me to participate in the care of this patient.  If you have any questions, please do not hesitate to contact me. [Sincerely,] : Sincerely, [FreeTextEntry2] : Conor Last MD\par  Address: [address] [place] [address] [FreeTextEntry3] : Khalif Bryan MD, YANDEL\par  Board Certified: Neurology, Clinical Neurophysiology, Epilepsy\par  , Department of Neurology\par  Epilepsy Fellowship \par  [facility]\par  \par

## 2023-07-31 NOTE — DATA REVIEWED
[de-identified] : 11/2022:  WM dz, nl temporal regions. [de-identified] : 7/2023  ER [facility], 7 szs overnight on EEG 4 left, 5 right, bitemporal IEDs, L  slowing on EEG

## 2023-07-31 NOTE — PHYSICAL EXAM
[FreeTextEntry1] : General Constitutional: alert and in no acute distress.  Psychiatric: affect normal, insight and judgment intact. Neurologic:  Orientation: oriented to person, place, and time 5/5, 5/5  Attention: normal concentrating ability and attention was not decreased.  DLROW 5/5. 2/3 recall Language: no difficulty naming common objects, repeating a phrase, writing a sentence; fluency, comprehension, and reading intact.  Fund of knowledge: displays adequate knowledge of personal past history.  Cranial Nerves: visual acuity intact bilaterally, visual fields full to confrontation, pupils equal round and reactive to light, extraocular motion intact, facial sensation intact symmetrically, face symmetrical, hearing was intact bilaterally, tongue and palate midline, head turning and shoulder shrug symmetric and there was no tongue deviation with protrusion.  Motor: the patient is right hand dominant.  muscle tone was normal in all four extremities, muscle strength was normal in all four extremities and normal bulk in all four extremities.   R frozen shoulder pain ext rotation. Coordination:. normal gait. balance was intact. there was no past-pointing. no tremor present.  Deep tendon reflexes:  Biceps right 2+. Biceps left 2+.  Triceps right 2+. Triceps left 2+.  Brachioradialis right 2+. Brachioradialis left 2+.  Patella right 2+. Patella left 2+.  Ankle jerk right 2+. Ankle jerk left 2+.  Eyes: the sclera and conjunctiva were normal.  Neck: the appearance of the neck was normal.  Musculoskeletal: no clubbing or cyanosis of the fingernails.  Skin: no lesions, rash

## 2023-08-01 ENCOUNTER — APPOINTMENT (OUTPATIENT)
Dept: INTERNAL MEDICINE | Facility: CLINIC | Age: 58
End: 2023-08-01
Payer: COMMERCIAL

## 2023-08-01 VITALS
TEMPERATURE: 98.6 F | HEART RATE: 93 BPM | WEIGHT: 243 LBS | SYSTOLIC BLOOD PRESSURE: 116 MMHG | OXYGEN SATURATION: 96 % | BODY MASS INDEX: 30.21 KG/M2 | HEIGHT: 75 IN | DIASTOLIC BLOOD PRESSURE: 77 MMHG

## 2023-08-01 DIAGNOSIS — Z51.81 ENCOUNTER FOR THERAPEUTIC DRUG LVL MONITORING: ICD-10-CM

## 2023-08-01 PROCEDURE — 99214 OFFICE O/P EST MOD 30 MIN: CPT

## 2023-08-01 PROCEDURE — 93000 ELECTROCARDIOGRAM COMPLETE: CPT | Mod: 1L

## 2023-08-01 NOTE — PLAN
[FreeTextEntry1] : EKG reviewed stable, advised patient EKG will be available in his chart for neurologist to review -Encouraged compliance w/ medication -To fu with EEG w/ neurology

## 2023-08-01 NOTE — HISTORY OF PRESENT ILLNESS
[Admitted on: ___] : The patient was admitted on [unfilled] [Post-hospitalization from ___ Hospital] : Post-hospitalization from [unfilled] Hospital [Discharged on ___] : discharged on [unfilled] [Patient Contacted By: ____] : and contacted by [unfilled] [FreeTextEntry2] : 59 y/o m. hx of epilepsy, CRISTAL admitted after sustaining fall related to possible seizure episode. He states had shoulder pain and felt unusual. He was found sitting on his basement stairs by his daughter.  He admits to noncompliance w/ Keppra.  He was found to have a concussion and was admitted for monitoring and EEG recorded seizure activity.  He was switched to Vimpat. His L shoulder was reduced due to posterior dislocation.  He has seen neurology post discharge, has labs pending, requires EKG today per request of neurology

## 2023-08-03 LAB
ALBUMIN SERPL ELPH-MCNC: 4.4 G/DL
ALP BLD-CCNC: 61 U/L
ALT SERPL-CCNC: 23 U/L
ANION GAP SERPL CALC-SCNC: 13 MMOL/L
AST SERPL-CCNC: 10 U/L
BILIRUB DIRECT SERPL-MCNC: 0.1 MG/DL
BILIRUB INDIRECT SERPL-MCNC: 0.4 MG/DL
BILIRUB SERPL-MCNC: 0.6 MG/DL
BUN SERPL-MCNC: 20 MG/DL
CALCIUM SERPL-MCNC: 9.9 MG/DL
CHLORIDE SERPL-SCNC: 102 MMOL/L
CO2 SERPL-SCNC: 27 MMOL/L
CREAT SERPL-MCNC: 0.93 MG/DL
EGFR: 94 ML/MIN/1.73M2
GLUCOSE SERPL-MCNC: 105 MG/DL
HCT VFR BLD CALC: 47.9 %
HGB BLD-MCNC: 15.3 G/DL
MCHC RBC-ENTMCNC: 29.9 PG
MCHC RBC-ENTMCNC: 31.9 GM/DL
MCV RBC AUTO: 93.7 FL
PLATELET # BLD AUTO: 317 K/UL
POTASSIUM SERPL-SCNC: 5.1 MMOL/L
PROT SERPL-MCNC: 6.9 G/DL
RBC # BLD: 5.11 M/UL
RBC # FLD: 13.9 %
SODIUM SERPL-SCNC: 142 MMOL/L
WBC # FLD AUTO: 5.81 K/UL

## 2023-08-05 LAB — LACOSAMIDE (VIMPAT): 8.34 UG/ML

## 2023-10-03 ENCOUNTER — APPOINTMENT (OUTPATIENT)
Dept: NEUROLOGY | Facility: CLINIC | Age: 58
End: 2023-10-03

## 2023-10-31 ENCOUNTER — APPOINTMENT (OUTPATIENT)
Dept: NEUROLOGY | Facility: CLINIC | Age: 58
End: 2023-10-31

## 2023-11-02 NOTE — ED ADULT NURSE NOTE - SUICIDE SCREENING QUESTION 2
Patient's mother called in requesting a letter excusing the patient from participate in PE Class at school due to his LT little finger fracture. No

## 2023-12-11 ENCOUNTER — APPOINTMENT (OUTPATIENT)
Dept: INTERNAL MEDICINE | Facility: CLINIC | Age: 58
End: 2023-12-11
Payer: COMMERCIAL

## 2023-12-11 ENCOUNTER — LABORATORY RESULT (OUTPATIENT)
Age: 58
End: 2023-12-11

## 2023-12-11 VITALS
WEIGHT: 242 LBS | SYSTOLIC BLOOD PRESSURE: 132 MMHG | HEART RATE: 67 BPM | HEIGHT: 75 IN | OXYGEN SATURATION: 97 % | TEMPERATURE: 97.9 F | BODY MASS INDEX: 30.09 KG/M2 | DIASTOLIC BLOOD PRESSURE: 84 MMHG

## 2023-12-11 DIAGNOSIS — Z00.00 ENCOUNTER FOR GENERAL ADULT MEDICAL EXAMINATION W/OUT ABNORMAL FINDINGS: ICD-10-CM

## 2023-12-11 PROCEDURE — G0444 DEPRESSION SCREEN ANNUAL: CPT | Mod: 59

## 2023-12-11 PROCEDURE — 99396 PREV VISIT EST AGE 40-64: CPT | Mod: 25

## 2023-12-11 PROCEDURE — 36415 COLL VENOUS BLD VENIPUNCTURE: CPT

## 2023-12-11 RX ORDER — LEVETIRACETAM 500 MG/1
500 TABLET, FILM COATED, EXTENDED RELEASE ORAL TWICE DAILY
Qty: 180 | Refills: 1 | Status: DISCONTINUED | COMMUNITY
Start: 2021-09-08 | End: 2023-12-11

## 2023-12-13 LAB
ALBUMIN SERPL ELPH-MCNC: 4.5 G/DL
ALP BLD-CCNC: 50 U/L
ALT SERPL-CCNC: 24 U/L
ANION GAP SERPL CALC-SCNC: 12 MMOL/L
AST SERPL-CCNC: 29 U/L
BASOPHILS # BLD AUTO: 0.1 K/UL
BASOPHILS NFR BLD AUTO: 1 %
BILIRUB SERPL-MCNC: 0.6 MG/DL
BUN SERPL-MCNC: 24 MG/DL
CALCIUM SERPL-MCNC: 9.8 MG/DL
CHLORIDE SERPL-SCNC: 100 MMOL/L
CO2 SERPL-SCNC: 27 MMOL/L
CREAT SERPL-MCNC: 1.1 MG/DL
EGFR: 77 ML/MIN/1.73M2
EOSINOPHIL # BLD AUTO: 0.31 K/UL
EOSINOPHIL NFR BLD AUTO: 3.2 %
GLUCOSE SERPL-MCNC: 87 MG/DL
HCT VFR BLD CALC: 46.2 %
HGB BLD-MCNC: 14.5 G/DL
IMM GRANULOCYTES NFR BLD AUTO: 0.3 %
LYMPHOCYTES # BLD AUTO: 2.35 K/UL
LYMPHOCYTES NFR BLD AUTO: 24.5 %
MAN DIFF?: NORMAL
MCHC RBC-ENTMCNC: 29.2 PG
MCHC RBC-ENTMCNC: 31.4 GM/DL
MCV RBC AUTO: 93 FL
MONOCYTES # BLD AUTO: 0.69 K/UL
MONOCYTES NFR BLD AUTO: 7.2 %
NEUTROPHILS # BLD AUTO: 6.1 K/UL
NEUTROPHILS NFR BLD AUTO: 63.8 %
PLATELET # BLD AUTO: 304 K/UL
POTASSIUM SERPL-SCNC: 4.6 MMOL/L
PROT SERPL-MCNC: 7.6 G/DL
RBC # BLD: 4.97 M/UL
RBC # FLD: 13.7 %
SODIUM SERPL-SCNC: 139 MMOL/L
WBC # FLD AUTO: 9.58 K/UL

## 2023-12-17 LAB
CHOLEST SERPL-MCNC: 209 MG/DL
ESTIMATED AVERAGE GLUCOSE: 117 MG/DL
HBA1C MFR BLD HPLC: 5.7 %
HDLC SERPL-MCNC: 57 MG/DL
LDLC SERPL CALC-MCNC: 136 MG/DL
NONHDLC SERPL-MCNC: 152 MG/DL
PSA FREE FLD-MCNC: 24 %
PSA FREE SERPL-MCNC: 1.03 NG/ML
PSA SERPL-MCNC: 4.25 NG/ML
TRIGL SERPL-MCNC: 90 MG/DL

## 2024-01-05 ENCOUNTER — TRANSCRIPTION ENCOUNTER (OUTPATIENT)
Age: 59
End: 2024-01-05

## 2024-01-18 ENCOUNTER — NON-APPOINTMENT (OUTPATIENT)
Age: 59
End: 2024-01-18

## 2024-01-19 ENCOUNTER — APPOINTMENT (OUTPATIENT)
Dept: NEUROLOGY | Facility: CLINIC | Age: 59
End: 2024-01-19
Payer: COMMERCIAL

## 2024-01-19 PROCEDURE — 95816 EEG AWAKE AND DROWSY: CPT

## 2024-01-20 PROCEDURE — 95700 EEG CONT REC W/VID EEG TECH: CPT

## 2024-01-20 PROCEDURE — 95708 EEG WO VID EA 12-26HR UNMNTR: CPT

## 2024-01-20 PROCEDURE — 95719 EEG PHYS/QHP EA INCR W/O VID: CPT

## 2024-01-24 ENCOUNTER — APPOINTMENT (OUTPATIENT)
Dept: UROLOGY | Facility: CLINIC | Age: 59
End: 2024-01-24
Payer: COMMERCIAL

## 2024-01-24 PROCEDURE — 99204 OFFICE O/P NEW MOD 45 MIN: CPT | Mod: 95

## 2024-01-24 RX ORDER — NAPROXEN 500 MG/1
500 TABLET ORAL TWICE DAILY
Qty: 20 | Refills: 3 | Status: COMPLETED | COMMUNITY
Start: 2021-09-08 | End: 2024-01-24

## 2024-01-24 NOTE — HISTORY OF PRESENT ILLNESS
[Home] : at home, [unfilled] , at the time of the visit. [Medical Office: ([facility])___] : at the medical office located in  [Verbal consent obtained from patient] : the patient, [unfilled] [FreeTextEntry1] : Dear Dr. Mami Cole(PCP)   Thank you so much for the referral to help care for your patient.   Chief Complaint: elevated PSA Date of first visit: 1/24/2024 Occupation: [employer] is a 58-year-old race man, with PMHx of epilepsy who presents for elevated PSA. His PSA is 4.25 ng/mL, drawn on 12/17/23. His historical PSA results are detailed below. He has not had a pelvic MRI nor a prostate biopsy. He denies any family history of  malignancies.   LUTS History Nocturia x 1  ED Morning erections present; can complete sexual intercourse.   PSA History 4.25 ng/mL on 12/17/23   MRI History N/A   Biopsy History N/A   The patient denies fevers, chills, nausea and or vomiting and no unexplained weight loss.   All pertinent laboratory, and physician notes were reviewed.  Questionnaire results were discussed with patient.   01/24/2024 IPSS 3 QOL 2 IIEF: 20   Prostate cancer screening: the patient and I spoke at length about prostate cancer screening, its risks and its benefits. The patient has 2 (age, elevated PSA) risk factors for prostate cancer.  He understands that many men with prostate cancer will die with the disease rather than of it and we also discussed the results large multi-center American and  prostate cancer screening trials. He also understands that PSA in and of itself does not diagnose prostate cancer but only assesses risk to a certain degree.   The patient understands that to definitively screen for prostate cancer, a biopsy is required, and this procedure has risks, including bleeding, infection, ED and urinary retention. The patient opted to proceed with a fusion biopsy.

## 2024-01-24 NOTE — ASSESSMENT
[FreeTextEntry1] : ROXY PENN is a 58-year-old race man, with PMHx of epilepsy who presents for elevated PSA. His PSA is 4.25 ng/mL, drawn on 12/17/23. He has not had a pelvic MRI nor a prostate biopsy. He denies any family history of  malignancies.   LUTS History Nocturia x 1  Discussed transperineal fusion guided biopsy with the patient today. Explained to patient that the MRI images and transrectal ultrasound images allows us to retrieve biopsy samples from the lesion seen on the MRI. We will also take samples from a 12-core biopsy template of the prostate to assess for the presence of clinically significant cancer. Discussed the use of local anesthesia for this procedure, in addition to the option for a mild oral sedative. Reviewed the importance of a fleet enema the morning of the procedure. Discussed with patient that a transperineal approach has a low risk for infection. Discussed risks and benefits of a transperineal fusion biopsy.   Patient agrees to move forward with transperineal biopsy with Dr. Nielsen. A written copy of instructions has been sent to the email address on file. Patient verbalized understanding of the procedure and instructions prior to his biopsy appointment.  1. Redraw PSA, if still elevated, then: 2. Schedule MRI - MRI needed for fusion biopsy 3. Schedule MRI review appointment with Dr. Nielsen 4. Schedule TP biopsy at [address] [place] [id number] with Dr. Nielsen 5. Schedule post biopsy review appointment with Dr. Nielsen

## 2024-01-24 NOTE — PHYSICAL EXAM
[Normal Appearance] : normal appearance [Well Groomed] : well groomed [General Appearance - In No Acute Distress] : no acute distress [] : no rash [No Focal Deficits] : no focal deficits [Oriented To Time, Place, And Person] : oriented to person, place, and time [Affect] : the affect was normal [Mood] : the mood was normal

## 2024-01-24 NOTE — REVIEW OF SYSTEMS
Please notify pt of normal pap, HPV neg.  Return in 1 year.   [Negative] : Endocrine [see HPI] : see HPI [FreeTextEntry2] : Epilepsy

## 2024-01-30 ENCOUNTER — TRANSCRIPTION ENCOUNTER (OUTPATIENT)
Age: 59
End: 2024-01-30

## 2024-01-31 LAB
PSA FREE FLD-MCNC: 24 %
PSA FREE SERPL-MCNC: 1.05 NG/ML
PSA SERPL-MCNC: 4.31 NG/ML

## 2024-02-05 ENCOUNTER — APPOINTMENT (OUTPATIENT)
Dept: NEUROLOGY | Facility: CLINIC | Age: 59
End: 2024-02-05
Payer: COMMERCIAL

## 2024-02-05 VITALS
HEIGHT: 75 IN | BODY MASS INDEX: 30.09 KG/M2 | HEART RATE: 69 BPM | WEIGHT: 242 LBS | DIASTOLIC BLOOD PRESSURE: 80 MMHG | SYSTOLIC BLOOD PRESSURE: 116 MMHG

## 2024-02-05 DIAGNOSIS — G40.909 EPILEPSY, UNSPECIFIED, NOT INTRACTABLE, W/OUT STATUS EPILEPTICUS: ICD-10-CM

## 2024-02-05 DIAGNOSIS — R40.4 TRANSIENT ALTERATION OF AWARENESS: ICD-10-CM

## 2024-02-05 PROCEDURE — 99213 OFFICE O/P EST LOW 20 MIN: CPT

## 2024-02-05 PROCEDURE — 95836 ECOG IMPLTD BRN NPGT <30 D: CPT

## 2024-02-05 RX ORDER — LACOSAMIDE 200 MG/1
200 TABLET ORAL TWICE DAILY
Qty: 180 | Refills: 1 | Status: ACTIVE | COMMUNITY
Start: 2023-07-31 | End: 1900-01-01

## 2024-02-05 NOTE — CONSULT LETTER
[Dear  ___] : Dear  [unfilled], [Consult Letter:] : I had the pleasure of evaluating your patient, [unfilled]. [( Thank you for referring [unfilled] for consultation for _____ )] : Thank you for referring [unfilled] for consultation for [unfilled] [Please see my note below.] : Please see my note below. [Consult Closing:] : Thank you very much for allowing me to participate in the care of this patient.  If you have any questions, please do not hesitate to contact me. [Sincerely,] : Sincerely, [FreeTextEntry2] : Conor Last MD\par  Address: [address] [place] [address] [FreeTextEntry3] : Khalif Bryan MD, YANDEL\par  Board Certified: Neurology, Clinical Neurophysiology, Epilepsy\par  , Department of Neurology\par  Epilepsy Fellowship \par  [facility]\par  \par

## 2024-02-05 NOTE — HISTORY OF PRESENT ILLNESS
[FreeTextEntry1] : Rx:   LAC 200mg bid now  Updates:    no ADRs, better compliance  1/2024: AEEG nl  7/2023 Had seizure found on base of stairs, dislocated left shoulder, concussion.  To ER [facility], 7 szs overnight on EEG 4 left, 5 right, bitemporal IEDs, L  slowing on EEG.  Had not been taking prior.  Changed to LAC per EMU MD preference.  -10/2022  Multiple episodes of seizures - ERUM humming, nonresponsive, drooling.  In ER at [facility] p vacation/flight.  Admitted issue with compliance reducing dose intermittently on own accord.  -3 other milder events per salvador in 2022  ROS:  Sleep study c/w CRISTAL.  Difficulty sleeping. Some chronic sleep issues. More forgetful  for names.  remote memory is good.  PMHX:  58 RH M initially seen with wife, Lenora. 6/2019 episode of amnesia in [place] x 5hrs, resolved spont.  Repeated question asking. x2 more times that year. 1 Mo later, episode of standing, not speaking, confused/disoriented.  Ex:  In confused state, after getting out of train, possibly after arousal. Went to wrong/old job location, unexplained why.    Saw Neurologist Dr Barcenas then Dr Gilliam - EEG done x 24hrs - told unrevealing.   Started on LEV empirically to 500mg bid.   4-5 more incidents in last year subsequent to med adjustments, from sleep, confused state, speaking, disoriented, amnestic x few minutes.  Staring, lip smacking described at least once.  HA afterwards. Most recent 9/7/21, missed Rx. Also, missed Rx with event in 12/2020. Reports poor compliance with Rx.  No ADR.  Occ HA.    SocHx: Works as . Was driving. Denies emotional trauma.  No TOB.  Occ ETOH use socially. No drugs.  PMHx: Events as child with possible staring spells/stiffening. no TBI, concussions, CNS infections. no FMHx szs.  FmHx:  Ma dc CAD, Fa dc colon ca brothers - COPD, CAD

## 2024-02-05 NOTE — DISCUSSION/SUMMARY
[FreeTextEntry1] : Episodes c/w focal epilepsy. Recurrent confusional episodes, amnesia. Concern for focal epilepsy.  Possible childhood seizures. Compliance concerns in recent past. Short term memory issues p szs onset  MRI/AEEG reportedly normal. 12/2020 EEG ? single T3 spike. CRISTAL per sleep study Shoulder dislocation  LUE in past due to szs  on LAC 200mg bid rec f/u ECG  ok consider LEV adjunct if sz persist.  better of late, Other options ie LTG. PER -sleep med f/u, hygiene -p event HA.  rec naproxen   prn -seizure precautions, recommendation is not to drive -sz risks included death, accidental injury discussed with pt/salvador -driving proscribed.  x time 21min RTC 4- 6mo  [Complex Partial] : complex partial [Idiopathic] : idiopathic  [Focal] : focal [Risks Associated with Driving/[place] Law] : As per my usual protocol, the patient was advised in regards to risks and driving privileges associated with the [place] Guidelines.  [Safety Recommendations] : The patient was advised in regards to the risk of seizures and general seizure safety recommendations including not to be bathing alone, climbing to high places and operating heavy machinery. [Compliance with Medications] : The importance of compliance with medications was reinforced. [Medication Side Effects] : High frequency and serious potential medication adverse effects were reviewed with the patient, including but not exclusive to psychiatric effects.  Information sheets on medication side effects were made available to the patient in our clinic.  The patient or advocate agrees to notify us for any concerns. [Sleep Hygiene/Sleep Disruption Risks] : Sleep hygiene and the risks of sleep disruption were discussed. [Risk of Death] : Risk of death associated with seizures / SUDEP was discussed. [Obtain Copies of Medical Records] : Patient was asked to obtain copies of pertinent prior medical records or studies

## 2024-02-05 NOTE — DATA REVIEWED
[de-identified] : 11/2022:  WM dz, nl temporal regions. [de-identified] : 7/2023  ER [facility], 7 szs overnight on EEG 4 left, 5 right, bitemporal IEDs, L  slowing on EEG 1/2024: AEEG nl

## 2024-02-17 ENCOUNTER — APPOINTMENT (OUTPATIENT)
Dept: MRI IMAGING | Facility: CLINIC | Age: 59
End: 2024-02-17
Payer: COMMERCIAL

## 2024-02-17 ENCOUNTER — RESULT REVIEW (OUTPATIENT)
Age: 59
End: 2024-02-17

## 2024-02-17 PROCEDURE — 76498P: CUSTOM

## 2024-02-17 PROCEDURE — 72197 MRI PELVIS W/O & W/DYE: CPT

## 2024-02-17 PROCEDURE — A9585: CPT

## 2024-02-20 ENCOUNTER — NON-APPOINTMENT (OUTPATIENT)
Age: 59
End: 2024-02-20

## 2024-02-21 ENCOUNTER — NON-APPOINTMENT (OUTPATIENT)
Age: 59
End: 2024-02-21

## 2024-03-07 ENCOUNTER — APPOINTMENT (OUTPATIENT)
Dept: UROLOGY | Facility: CLINIC | Age: 59
End: 2024-03-07
Payer: COMMERCIAL

## 2024-03-07 VITALS
TEMPERATURE: 97.3 F | SYSTOLIC BLOOD PRESSURE: 104 MMHG | WEIGHT: 242 LBS | HEART RATE: 74 BPM | BODY MASS INDEX: 30.09 KG/M2 | DIASTOLIC BLOOD PRESSURE: 68 MMHG | RESPIRATION RATE: 16 BRPM | OXYGEN SATURATION: 97 % | HEIGHT: 75 IN

## 2024-03-07 DIAGNOSIS — R97.20 ELEVATED PROSTATE, SPECIFIC ANTIGEN [PSA]: ICD-10-CM

## 2024-03-07 PROCEDURE — 99213 OFFICE O/P EST LOW 20 MIN: CPT

## 2024-03-08 PROBLEM — R97.20 ELEVATED PSA: Status: ACTIVE | Noted: 2023-12-17

## 2024-03-08 NOTE — HISTORY OF PRESENT ILLNESS
[FreeTextEntry1] : 59yo gentleman with cc of elevated PSA. Pt was seen by PCP for routine physical that showed PSA elevation to 4.25. Repeat PSA 4.3. Has had prior PSAs he believes at annual physical. Never told elevated PSA in the past.   Has nocturia x1. Drinks a lot of water and and goes every 2-3h at work. Drinks 1 cup of tea per day. No EtOH. No hx of UTI. Hx of kidney stone x1 passed on his own.   Dad with hx of colon ca at age 65.   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 Patchy inflammatory changes seen.

## 2024-03-08 NOTE — ASSESSMENT
[FreeTextEntry1] : Elevated PSA. Reassuring PSAd. No suspicious lesions. Reassurance provided.  --D/c biopsy --F/u in 1y with PSA

## 2024-03-08 NOTE — PHYSICAL EXAM
[General Appearance - Well Developed] : well developed [General Appearance - Well Nourished] : well nourished [General Appearance - In No Acute Distress] : no acute distress [Exaggerated Use Of Accessory Muscles For Inspiration] : no accessory muscle use [Abdomen Tenderness] : non-tender [Costovertebral Angle Tenderness] : no ~M costovertebral angle tenderness [No Focal Deficits] : no focal deficits [Oriented To Time, Place, And Person] : oriented to person, place, and time

## 2024-03-15 ENCOUNTER — APPOINTMENT (OUTPATIENT)
Dept: UROLOGY | Facility: CLINIC | Age: 59
End: 2024-03-15

## 2024-03-27 ENCOUNTER — APPOINTMENT (OUTPATIENT)
Dept: UROLOGY | Facility: CLINIC | Age: 59
End: 2024-03-27

## 2024-08-14 ENCOUNTER — RX RENEWAL (OUTPATIENT)
Age: 59
End: 2024-08-14

## 2024-09-12 NOTE — ED ADULT NURSE NOTE - PRO INTERPRETER NEED 2
Taqueria is here today for   Chief Complaint   Patient presents with    Follow-up     6 months     .        Medication Refills needed today?  YES,   if you receive a prescription today would you like it to be sent to [facility]? YES    Medications: medications verified and updated    Patient would like communication of their results via:    Cell Phone:   Telephone Information:   Mobile [phone]     Okay to leave a message containing results? Yes    Tobacco history: verified         Health Maintenance       Varicella Vaccine (1 of 2 - 13+ 2-dose series)  Never done    COVID-19 Vaccine (6 - 2023-24 season)  Overdue since 9/1/2024    Influenza Vaccine (1)  Due since 9/1/2024    Hepatitis B Vaccine (1 of 3 - 19+ 3-dose series)  Postponed until 3/22/2025           Following review of the above:  Patient wishes to discuss with clinician: COVID-19, Influenza, and Varicella    Note: Refer to final orders and clinician documentation.            COVID-19 Vaccine Interest Assessment:   Unable to assess interest.  If the patient reports an outside immunization, please update the WIR/ICARE information in the Immunizations activity          English

## 2024-11-18 ENCOUNTER — TRANSCRIPTION ENCOUNTER (OUTPATIENT)
Age: 59
End: 2024-11-18

## 2024-12-12 ENCOUNTER — NON-APPOINTMENT (OUTPATIENT)
Age: 59
End: 2024-12-12

## 2024-12-12 ENCOUNTER — APPOINTMENT (OUTPATIENT)
Dept: INTERNAL MEDICINE | Facility: CLINIC | Age: 59
End: 2024-12-12
Payer: COMMERCIAL

## 2024-12-12 VITALS
WEIGHT: 150 LBS | BODY MASS INDEX: 19.67 KG/M2 | OXYGEN SATURATION: 98 % | HEIGHT: 73.23 IN | TEMPERATURE: 98.1 F | DIASTOLIC BLOOD PRESSURE: 73 MMHG | SYSTOLIC BLOOD PRESSURE: 132 MMHG | HEART RATE: 63 BPM

## 2024-12-12 DIAGNOSIS — G40.909 EPILEPSY, UNSPECIFIED, NOT INTRACTABLE, W/OUT STATUS EPILEPTICUS: ICD-10-CM

## 2024-12-12 DIAGNOSIS — Z01.818 ENCOUNTER FOR OTHER PREPROCEDURAL EXAMINATION: ICD-10-CM

## 2024-12-12 PROCEDURE — 99213 OFFICE O/P EST LOW 20 MIN: CPT

## 2024-12-12 PROCEDURE — 93000 ELECTROCARDIOGRAM COMPLETE: CPT

## 2025-02-25 ENCOUNTER — APPOINTMENT (OUTPATIENT)
Dept: NEUROLOGY | Facility: CLINIC | Age: 60
End: 2025-02-25

## 2025-03-07 ENCOUNTER — APPOINTMENT (OUTPATIENT)
Dept: NEUROLOGY | Facility: CLINIC | Age: 60
End: 2025-03-07

## 2025-03-20 ENCOUNTER — APPOINTMENT (OUTPATIENT)
Dept: NEUROLOGY | Facility: CLINIC | Age: 60
End: 2025-03-20
Payer: COMMERCIAL

## 2025-03-20 VITALS
WEIGHT: 245 LBS | BODY MASS INDEX: 30.46 KG/M2 | SYSTOLIC BLOOD PRESSURE: 132 MMHG | HEIGHT: 75 IN | HEART RATE: 85 BPM | DIASTOLIC BLOOD PRESSURE: 77 MMHG

## 2025-03-20 DIAGNOSIS — G40.909 EPILEPSY, UNSPECIFIED, NOT INTRACTABLE, W/OUT STATUS EPILEPTICUS: ICD-10-CM

## 2025-03-20 PROCEDURE — 99213 OFFICE O/P EST LOW 20 MIN: CPT

## 2025-03-20 PROCEDURE — G2211 COMPLEX E/M VISIT ADD ON: CPT

## 2025-06-17 ENCOUNTER — NON-APPOINTMENT (OUTPATIENT)
Age: 60
End: 2025-06-17

## 2025-07-21 ENCOUNTER — APPOINTMENT (OUTPATIENT)
Dept: INTERNAL MEDICINE | Facility: CLINIC | Age: 60
End: 2025-07-21
Payer: COMMERCIAL

## 2025-07-21 VITALS
HEART RATE: 60 BPM | SYSTOLIC BLOOD PRESSURE: 110 MMHG | TEMPERATURE: 98 F | OXYGEN SATURATION: 94 % | RESPIRATION RATE: 16 BRPM | WEIGHT: 250 LBS | BODY MASS INDEX: 31.08 KG/M2 | HEIGHT: 75 IN | DIASTOLIC BLOOD PRESSURE: 71 MMHG

## 2025-07-21 DIAGNOSIS — Z51.81 ENCOUNTER FOR THERAPEUTIC DRUG LVL MONITORING: ICD-10-CM

## 2025-07-21 DIAGNOSIS — Z01.818 ENCOUNTER FOR OTHER PREPROCEDURAL EXAMINATION: ICD-10-CM

## 2025-07-21 DIAGNOSIS — Z00.00 ENCOUNTER FOR GENERAL ADULT MEDICAL EXAMINATION W/OUT ABNORMAL FINDINGS: ICD-10-CM

## 2025-07-21 PROCEDURE — 99396 PREV VISIT EST AGE 40-64: CPT

## 2025-07-21 PROCEDURE — 36415 COLL VENOUS BLD VENIPUNCTURE: CPT

## 2025-07-21 PROCEDURE — 93000 ELECTROCARDIOGRAM COMPLETE: CPT

## 2025-07-26 LAB
ALBUMIN SERPL ELPH-MCNC: 4.4 G/DL
ALP BLD-CCNC: 55 U/L
ALT SERPL-CCNC: 25 U/L
ANION GAP SERPL CALC-SCNC: 13 MMOL/L
AST SERPL-CCNC: 30 U/L
BILIRUB SERPL-MCNC: 0.4 MG/DL
BUN SERPL-MCNC: 24 MG/DL
CALCIUM SERPL-MCNC: 9.3 MG/DL
CHLORIDE SERPL-SCNC: 105 MMOL/L
CHOLEST SERPL-MCNC: 190 MG/DL
CO2 SERPL-SCNC: 24 MMOL/L
CREAT SERPL-MCNC: 0.95 MG/DL
EGFRCR SERPLBLD CKD-EPI 2021: 90 ML/MIN/1.73M2
ESTIMATED AVERAGE GLUCOSE: 120 MG/DL
GLUCOSE SERPL-MCNC: 90 MG/DL
HBA1C MFR BLD HPLC: 5.8 %
HCT VFR BLD CALC: 44 %
HDLC SERPL-MCNC: 56 MG/DL
HGB BLD-MCNC: 13.9 G/DL
LDLC SERPL-MCNC: 112 MG/DL
MCHC RBC-ENTMCNC: 29.5 PG
MCHC RBC-ENTMCNC: 31.6 G/DL
MCV RBC AUTO: 93.4 FL
NONHDLC SERPL-MCNC: 134 MG/DL
PLATELET # BLD AUTO: 273 K/UL
POTASSIUM SERPL-SCNC: 4.6 MMOL/L
PROT SERPL-MCNC: 7.1 G/DL
PSA SERPL-MCNC: 4.16 NG/ML
RBC # BLD: 4.71 M/UL
RBC # FLD: 14 %
SODIUM SERPL-SCNC: 141 MMOL/L
TRIGL SERPL-MCNC: 127 MG/DL
WBC # FLD AUTO: 9.31 K/UL